# Patient Record
Sex: FEMALE | Race: WHITE | NOT HISPANIC OR LATINO | Employment: OTHER | URBAN - METROPOLITAN AREA
[De-identification: names, ages, dates, MRNs, and addresses within clinical notes are randomized per-mention and may not be internally consistent; named-entity substitution may affect disease eponyms.]

---

## 2023-04-08 DIAGNOSIS — I10 PRIMARY HYPERTENSION: Primary | ICD-10-CM

## 2023-04-10 RX ORDER — LISINOPRIL 40 MG/1
TABLET ORAL
Qty: 90 TABLET | Refills: 0 | Status: SHIPPED | OUTPATIENT
Start: 2023-04-10 | End: 2023-04-11

## 2023-04-11 DIAGNOSIS — I10 PRIMARY HYPERTENSION: ICD-10-CM

## 2023-04-11 RX ORDER — LISINOPRIL 40 MG/1
TABLET ORAL
Qty: 90 TABLET | Refills: 0 | Status: SHIPPED | OUTPATIENT
Start: 2023-04-11 | End: 2023-05-23 | Stop reason: SDUPTHER

## 2023-05-23 ENCOUNTER — LAB (OUTPATIENT)
Dept: LAB | Facility: LAB | Age: 78
End: 2023-05-23
Payer: MEDICARE

## 2023-05-23 ENCOUNTER — OFFICE VISIT (OUTPATIENT)
Dept: PRIMARY CARE | Facility: CLINIC | Age: 78
End: 2023-05-23
Payer: MEDICARE

## 2023-05-23 VITALS
RESPIRATION RATE: 18 BRPM | SYSTOLIC BLOOD PRESSURE: 114 MMHG | HEART RATE: 54 BPM | BODY MASS INDEX: 24.65 KG/M2 | WEIGHT: 153.4 LBS | OXYGEN SATURATION: 97 % | HEIGHT: 66 IN | DIASTOLIC BLOOD PRESSURE: 72 MMHG

## 2023-05-23 DIAGNOSIS — Z00.00 ROUTINE GENERAL MEDICAL EXAMINATION AT HEALTH CARE FACILITY: ICD-10-CM

## 2023-05-23 DIAGNOSIS — M47.816 LUMBAR SPONDYLOSIS: ICD-10-CM

## 2023-05-23 DIAGNOSIS — I10 PRIMARY HYPERTENSION: ICD-10-CM

## 2023-05-23 DIAGNOSIS — I10 PRIMARY HYPERTENSION: Primary | ICD-10-CM

## 2023-05-23 DIAGNOSIS — Z00.00 MEDICARE ANNUAL WELLNESS VISIT, SUBSEQUENT: ICD-10-CM

## 2023-05-23 DIAGNOSIS — Z12.31 SCREENING MAMMOGRAM, ENCOUNTER FOR: ICD-10-CM

## 2023-05-23 PROBLEM — N63.0 BREAST MASS: Status: RESOLVED | Noted: 2023-05-23 | Resolved: 2023-05-23

## 2023-05-23 PROBLEM — M48.061 LUMBAR STENOSIS: Status: RESOLVED | Noted: 2023-05-23 | Resolved: 2023-05-23

## 2023-05-23 PROBLEM — M53.3 SACROILIAC JOINT DYSFUNCTION OF RIGHT SIDE: Status: RESOLVED | Noted: 2023-05-23 | Resolved: 2023-05-23

## 2023-05-23 PROBLEM — M54.50 CHRONIC LOW BACK PAIN: Status: ACTIVE | Noted: 2023-05-23

## 2023-05-23 PROBLEM — M54.16 LUMBAR RADICULOPATHY: Status: RESOLVED | Noted: 2023-05-23 | Resolved: 2023-05-23

## 2023-05-23 PROBLEM — D73.4 SPLENIC CYST: Status: RESOLVED | Noted: 2023-05-23 | Resolved: 2023-05-23

## 2023-05-23 PROBLEM — R10.13 DYSPEPSIA: Status: RESOLVED | Noted: 2023-05-23 | Resolved: 2023-05-23

## 2023-05-23 PROBLEM — M17.9 OSTEOARTHRITIS OF KNEE: Status: RESOLVED | Noted: 2023-05-23 | Resolved: 2023-05-23

## 2023-05-23 PROBLEM — E78.00 HYPERCHOLESTEROLEMIA: Status: ACTIVE | Noted: 2023-05-23

## 2023-05-23 PROBLEM — M15.9 POLYOSTEOARTHRITIS, UNSPECIFIED: Status: RESOLVED | Noted: 2023-05-23 | Resolved: 2023-05-23

## 2023-05-23 PROBLEM — G89.29 CHRONIC LOW BACK PAIN: Status: ACTIVE | Noted: 2023-05-23

## 2023-05-23 LAB
ALANINE AMINOTRANSFERASE (SGPT) (U/L) IN SER/PLAS: 17 U/L (ref 7–45)
ALBUMIN (G/DL) IN SER/PLAS: 4.4 G/DL (ref 3.4–5)
ALKALINE PHOSPHATASE (U/L) IN SER/PLAS: 42 U/L (ref 33–136)
ANION GAP IN SER/PLAS: 13 MMOL/L (ref 10–20)
ASPARTATE AMINOTRANSFERASE (SGOT) (U/L) IN SER/PLAS: 20 U/L (ref 9–39)
BILIRUBIN TOTAL (MG/DL) IN SER/PLAS: 0.7 MG/DL (ref 0–1.2)
CALCIUM (MG/DL) IN SER/PLAS: 9.6 MG/DL (ref 8.6–10.3)
CARBON DIOXIDE, TOTAL (MMOL/L) IN SER/PLAS: 30 MMOL/L (ref 21–32)
CHLORIDE (MMOL/L) IN SER/PLAS: 102 MMOL/L (ref 98–107)
CHOLESTEROL (MG/DL) IN SER/PLAS: 167 MG/DL (ref 0–199)
CHOLESTEROL IN HDL (MG/DL) IN SER/PLAS: 49.6 MG/DL
CHOLESTEROL/HDL RATIO: 3.4
CREATININE (MG/DL) IN SER/PLAS: 1.27 MG/DL (ref 0.5–1.05)
ERYTHROCYTE DISTRIBUTION WIDTH (RATIO) BY AUTOMATED COUNT: 13.4 % (ref 11.5–14.5)
ERYTHROCYTE MEAN CORPUSCULAR HEMOGLOBIN CONCENTRATION (G/DL) BY AUTOMATED: 32 G/DL (ref 32–36)
ERYTHROCYTE MEAN CORPUSCULAR VOLUME (FL) BY AUTOMATED COUNT: 94 FL (ref 80–100)
ERYTHROCYTES (10*6/UL) IN BLOOD BY AUTOMATED COUNT: 4.23 X10E12/L (ref 4–5.2)
GFR FEMALE: 44 ML/MIN/1.73M2
GLUCOSE (MG/DL) IN SER/PLAS: 89 MG/DL (ref 74–99)
HEMATOCRIT (%) IN BLOOD BY AUTOMATED COUNT: 39.7 % (ref 36–46)
HEMOGLOBIN (G/DL) IN BLOOD: 12.7 G/DL (ref 12–16)
LDL: 77 MG/DL (ref 0–99)
LEUKOCYTES (10*3/UL) IN BLOOD BY AUTOMATED COUNT: 4.1 X10E9/L (ref 4.4–11.3)
NON HDL CHOLESTEROL: 117 MG/DL
PLATELETS (10*3/UL) IN BLOOD AUTOMATED COUNT: 197 X10E9/L (ref 150–450)
POTASSIUM (MMOL/L) IN SER/PLAS: 4.2 MMOL/L (ref 3.5–5.3)
PROTEIN TOTAL: 6.9 G/DL (ref 6.4–8.2)
SODIUM (MMOL/L) IN SER/PLAS: 141 MMOL/L (ref 136–145)
TRIGLYCERIDE (MG/DL) IN SER/PLAS: 200 MG/DL (ref 0–149)
UREA NITROGEN (MG/DL) IN SER/PLAS: 31 MG/DL (ref 6–23)
VLDL: 40 MG/DL (ref 0–40)

## 2023-05-23 PROCEDURE — 3078F DIAST BP <80 MM HG: CPT | Performed by: NURSE PRACTITIONER

## 2023-05-23 PROCEDURE — 3074F SYST BP LT 130 MM HG: CPT | Performed by: NURSE PRACTITIONER

## 2023-05-23 PROCEDURE — 36415 COLL VENOUS BLD VENIPUNCTURE: CPT

## 2023-05-23 PROCEDURE — 80061 LIPID PANEL: CPT

## 2023-05-23 PROCEDURE — 1160F RVW MEDS BY RX/DR IN RCRD: CPT | Performed by: NURSE PRACTITIONER

## 2023-05-23 PROCEDURE — 80053 COMPREHEN METABOLIC PANEL: CPT

## 2023-05-23 PROCEDURE — 1036F TOBACCO NON-USER: CPT | Performed by: NURSE PRACTITIONER

## 2023-05-23 PROCEDURE — 1159F MED LIST DOCD IN RCRD: CPT | Performed by: NURSE PRACTITIONER

## 2023-05-23 PROCEDURE — 1170F FXNL STATUS ASSESSED: CPT | Performed by: NURSE PRACTITIONER

## 2023-05-23 PROCEDURE — G0439 PPPS, SUBSEQ VISIT: HCPCS | Performed by: NURSE PRACTITIONER

## 2023-05-23 PROCEDURE — 99214 OFFICE O/P EST MOD 30 MIN: CPT | Performed by: NURSE PRACTITIONER

## 2023-05-23 PROCEDURE — 85027 COMPLETE CBC AUTOMATED: CPT

## 2023-05-23 RX ORDER — ATENOLOL 100 MG/1
100 TABLET ORAL
COMMUNITY
Start: 2020-08-02 | End: 2023-05-23 | Stop reason: SDUPTHER

## 2023-05-23 RX ORDER — SIMVASTATIN 40 MG/1
40 TABLET, FILM COATED ORAL DAILY
Qty: 90 TABLET | Refills: 3 | Status: SHIPPED | OUTPATIENT
Start: 2023-05-23 | End: 2024-05-20

## 2023-05-23 RX ORDER — HYDROCHLOROTHIAZIDE 25 MG/1
25 TABLET ORAL DAILY
Qty: 90 TABLET | Refills: 3 | Status: SHIPPED | OUTPATIENT
Start: 2023-05-23 | End: 2024-04-01 | Stop reason: SDUPTHER

## 2023-05-23 RX ORDER — PREGABALIN 50 MG/1
50 CAPSULE ORAL 2 TIMES DAILY
Qty: 60 CAPSULE | Refills: 2 | COMMUNITY
Start: 2023-05-11 | End: 2023-08-09

## 2023-05-23 RX ORDER — ATENOLOL 100 MG/1
100 TABLET ORAL
Qty: 90 TABLET | Refills: 3 | Status: SHIPPED | OUTPATIENT
Start: 2023-05-23 | End: 2024-05-20

## 2023-05-23 RX ORDER — HYDROCHLOROTHIAZIDE 25 MG/1
1 TABLET ORAL DAILY
COMMUNITY
Start: 2013-03-06 | End: 2023-05-23 | Stop reason: SDUPTHER

## 2023-05-23 RX ORDER — LISINOPRIL 40 MG/1
40 TABLET ORAL DAILY
Qty: 90 TABLET | Refills: 3 | Status: SHIPPED | OUTPATIENT
Start: 2023-05-23 | End: 2024-04-01 | Stop reason: SDUPTHER

## 2023-05-23 RX ORDER — SIMVASTATIN 40 MG/1
40 TABLET, FILM COATED ORAL DAILY
COMMUNITY
End: 2023-05-23 | Stop reason: SDUPTHER

## 2023-05-23 RX ORDER — PREGABALIN 100 MG/1
100 CAPSULE ORAL 2 TIMES DAILY
Qty: 60 CAPSULE | Refills: 5 | COMMUNITY
Start: 2023-05-11 | End: 2023-11-07

## 2023-05-23 RX ORDER — CALCIUM CARBONATE 600 MG
1 TABLET ORAL DAILY
COMMUNITY

## 2023-05-23 RX ORDER — VIT C/E/ZN/COPPR/LUTEIN/ZEAXAN 250MG-90MG
CAPSULE ORAL
COMMUNITY

## 2023-05-23 ASSESSMENT — PATIENT HEALTH QUESTIONNAIRE - PHQ9
SUM OF ALL RESPONSES TO PHQ9 QUESTIONS 1 AND 2: 0
2. FEELING DOWN, DEPRESSED OR HOPELESS: NOT AT ALL
1. LITTLE INTEREST OR PLEASURE IN DOING THINGS: NOT AT ALL

## 2023-05-23 ASSESSMENT — ENCOUNTER SYMPTOMS
OCCASIONAL FEELINGS OF UNSTEADINESS: 0
DEPRESSION: 0
LOSS OF SENSATION IN FEET: 0

## 2023-05-23 ASSESSMENT — ACTIVITIES OF DAILY LIVING (ADL)
TAKING_MEDICATION: INDEPENDENT
DRESSING: INDEPENDENT
MANAGING_FINANCES: INDEPENDENT
BATHING: INDEPENDENT
GROCERY_SHOPPING: INDEPENDENT
DOING_HOUSEWORK: INDEPENDENT

## 2023-05-23 NOTE — ASSESSMENT & PLAN NOTE
- UTD with vaccine   -  colonoscopy due 2025  - mammogram ordered  - dexa 2020 normal - due 2025  -  living will reviewed with patient

## 2023-05-23 NOTE — PATIENT INSTRUCTIONS
I ordered your mammogram due 9/6/23    I ordered your fasting labs    Sent in your refills as well

## 2023-05-23 NOTE — ASSESSMENT & PLAN NOTE
- in office /72  -  continue atenolol  - continue hydrochlorothiazide   - cbc,cmp, lipid   -  follow up in 6 months

## 2023-05-23 NOTE — PROGRESS NOTES
"Subjective   Reason for Visit: Ana Bolton is an 77 y.o. female here for a Medicare Wellness visit.     Past Medical, Surgical, and Family History reviewed and updated in chart.    Reviewed all medications by prescribing practitioner or clinical pharmacist (such as prescriptions, OTCs, herbal therapies and supplements) and documented in the medical record.    Presents for AMW    Mammogram 9/2022  Colonoscopy due 2025  Bone density - 2020 due 2025  PPSV 2011    She does not smoke       Denies complaints     She has lost 10 pounds since her last visit.  She has been walking most days of the week to help     Follows Dr. Garvey - [facility]        Patient Care Team:  URSULA Prather as PCP - General  URSULA Prather as PCP - [employer]age PCP     Review of Systems   All other systems reviewed and are negative.      Objective   Vitals:  /72   Pulse 54   Resp 18   Ht 1.676 m (5' 6\")   Wt 69.6 kg (153 lb 6.4 oz)   SpO2 97%   BMI 24.76 kg/m²       Physical Exam  Vitals and nursing note reviewed.   Constitutional:       Appearance: Normal appearance. She is normal weight.   HENT:      Head: Normocephalic and atraumatic.      Right Ear: Tympanic membrane, ear canal and external ear normal.      Left Ear: Tympanic membrane, ear canal and external ear normal.      Nose: Nose normal.      Mouth/Throat:      Mouth: Mucous membranes are moist.      Pharynx: Oropharynx is clear.   Eyes:      Extraocular Movements: Extraocular movements intact.      Conjunctiva/sclera: Conjunctivae normal.      Pupils: Pupils are equal, round, and reactive to light.   Neck:      Vascular: No carotid bruit.   Cardiovascular:      Rate and Rhythm: Normal rate and regular rhythm.      Pulses: Normal pulses.      Heart sounds: Normal heart sounds.   Pulmonary:      Effort: Pulmonary effort is normal.      Breath sounds: Normal breath sounds.   Abdominal:      General: Bowel sounds are normal. " There is no distension.      Palpations: Abdomen is soft.   Musculoskeletal:         General: Normal range of motion.      Cervical back: Normal range of motion and neck supple.      Right lower leg: No edema.      Left lower leg: No edema.   Lymphadenopathy:      Cervical: No cervical adenopathy.   Skin:     General: Skin is warm and dry.      Capillary Refill: Capillary refill takes less than 2 seconds.   Neurological:      General: No focal deficit present.      Mental Status: She is alert and oriented to person, place, and time. Mental status is at baseline.   Psychiatric:         Mood and Affect: Mood normal.         Behavior: Behavior normal.         Thought Content: Thought content normal.         Judgment: Judgment normal.         Assessment/Plan   Problem List Items Addressed This Visit          Circulatory    Hypertension - Primary    Overview     - in office /72  -  continue atenolol  - continue hydrochlorothiazide   - cbc,cmp, lipid   -  follow up in 6 months           Current Assessment & Plan     - in office /72  -  continue atenolol  - continue hydrochlorothiazide   - cbc,cmp, lipid   -  follow up in 6 months         Relevant Medications    simvastatin (Zocor) 40 mg tablet    lisinopril 40 mg tablet    hydroCHLOROthiazide (HYDRODiuril) 25 mg tablet    atenolol (Tenormin) 100 mg tablet    Other Relevant Orders    CBC    Lipid Panel    Comprehensive Metabolic Panel       Musculoskeletal    Lumbar spondylosis    Overview     Stable   Follows Dr. Garvey  Taking lyrica         Current Assessment & Plan     Stable   Follows Dr. Garvey  Taking lyrica            Other    Medicare annual wellness visit, subsequent    Overview     - UTD with vaccine   -  colonoscopy due 2025  - mammogram ordered  - dexa 2020 normal - due 2025  -  living will reviewed with patient           Current Assessment & Plan     - UTD with vaccine   -  colonoscopy due 2025  - mammogram ordered  - dexa 2020 normal - due 2025  -   living will reviewed with patient          Other Visit Diagnoses       Routine general medical examination at health care facility        Screening mammogram, encounter for        Relevant Orders    BI mammo bilateral screening tomosynthesis

## 2023-06-06 ENCOUNTER — OFFICE VISIT (OUTPATIENT)
Dept: PRIMARY CARE | Facility: CLINIC | Age: 78
End: 2023-06-06
Payer: MEDICARE

## 2023-06-06 VITALS
SYSTOLIC BLOOD PRESSURE: 134 MMHG | BODY MASS INDEX: 25.07 KG/M2 | DIASTOLIC BLOOD PRESSURE: 78 MMHG | HEIGHT: 66 IN | OXYGEN SATURATION: 98 % | HEART RATE: 60 BPM | WEIGHT: 156 LBS

## 2023-06-06 DIAGNOSIS — L24.7 IRRITANT CONTACT DERMATITIS DUE TO PLANTS, EXCEPT FOOD: Primary | ICD-10-CM

## 2023-06-06 PROCEDURE — 99213 OFFICE O/P EST LOW 20 MIN: CPT | Performed by: STUDENT IN AN ORGANIZED HEALTH CARE EDUCATION/TRAINING PROGRAM

## 2023-06-06 PROCEDURE — 1159F MED LIST DOCD IN RCRD: CPT | Performed by: STUDENT IN AN ORGANIZED HEALTH CARE EDUCATION/TRAINING PROGRAM

## 2023-06-06 PROCEDURE — 3075F SYST BP GE 130 - 139MM HG: CPT | Performed by: STUDENT IN AN ORGANIZED HEALTH CARE EDUCATION/TRAINING PROGRAM

## 2023-06-06 PROCEDURE — 1160F RVW MEDS BY RX/DR IN RCRD: CPT | Performed by: STUDENT IN AN ORGANIZED HEALTH CARE EDUCATION/TRAINING PROGRAM

## 2023-06-06 PROCEDURE — 3078F DIAST BP <80 MM HG: CPT | Performed by: STUDENT IN AN ORGANIZED HEALTH CARE EDUCATION/TRAINING PROGRAM

## 2023-06-06 PROCEDURE — 1036F TOBACCO NON-USER: CPT | Performed by: STUDENT IN AN ORGANIZED HEALTH CARE EDUCATION/TRAINING PROGRAM

## 2023-06-06 RX ORDER — METHYLPREDNISOLONE 4 MG/1
TABLET ORAL
Qty: 21 TABLET | Refills: 0 | Status: SHIPPED | OUTPATIENT
Start: 2023-06-06 | End: 2023-06-13

## 2023-06-06 ASSESSMENT — PATIENT HEALTH QUESTIONNAIRE - PHQ9
SUM OF ALL RESPONSES TO PHQ9 QUESTIONS 1 AND 2: 0
1. LITTLE INTEREST OR PLEASURE IN DOING THINGS: NOT AT ALL
2. FEELING DOWN, DEPRESSED OR HOPELESS: NOT AT ALL

## 2023-06-06 NOTE — PROGRESS NOTES
Subjective   Patient ID: Ana Bolton is a 77 y.o. female who presents for Poison Chanda (Bon is here for poison ivy X 10  days on both arms and stomach ).  Poison Ivy  This is a new problem. The current episode started 1 to 4 weeks ago. The problem has been gradually worsening since onset. The affected locations include the abdomen, left wrist, left hand and right shoulder. Past treatments include topical steroids.       Review of Systems    Objective   Physical Exam  Constitutional:       Appearance: Normal appearance.   Cardiovascular:      Rate and Rhythm: Normal rate and regular rhythm.      Heart sounds: No murmur heard.  Pulmonary:      Effort: Pulmonary effort is normal. No respiratory distress.      Breath sounds: Normal breath sounds. No wheezing.   Musculoskeletal:      Cervical back: Neck supple.      Left lower leg: No edema.   Skin:            Comments: Erythemtatous papules    Neurological:      Mental Status: She is alert.       Assessment/Plan   Diagnoses and all orders for this visit:  Irritant contact dermatitis due to plants, except food  -     methylPREDNISolone (Medrol Dospak) 4 mg tablets; Take as directed on package.

## 2023-11-28 ENCOUNTER — APPOINTMENT (OUTPATIENT)
Dept: PRIMARY CARE | Facility: CLINIC | Age: 78
End: 2023-11-28
Payer: MEDICARE

## 2024-01-22 ENCOUNTER — APPOINTMENT (OUTPATIENT)
Dept: PRIMARY CARE | Facility: CLINIC | Age: 79
End: 2024-01-22
Payer: MEDICARE

## 2024-04-01 ENCOUNTER — OFFICE VISIT (OUTPATIENT)
Dept: PRIMARY CARE | Facility: CLINIC | Age: 79
End: 2024-04-01
Payer: MEDICARE

## 2024-04-01 VITALS
SYSTOLIC BLOOD PRESSURE: 116 MMHG | RESPIRATION RATE: 18 BRPM | DIASTOLIC BLOOD PRESSURE: 74 MMHG | HEART RATE: 74 BPM | OXYGEN SATURATION: 96 % | WEIGHT: 153.4 LBS | HEIGHT: 66 IN | BODY MASS INDEX: 24.65 KG/M2

## 2024-04-01 DIAGNOSIS — E78.00 HYPERCHOLESTEROLEMIA: ICD-10-CM

## 2024-04-01 DIAGNOSIS — Z00.00 ROUTINE GENERAL MEDICAL EXAMINATION AT HEALTH CARE FACILITY: Primary | ICD-10-CM

## 2024-04-01 DIAGNOSIS — M54.41 CHRONIC MIDLINE LOW BACK PAIN WITH RIGHT-SIDED SCIATICA: ICD-10-CM

## 2024-04-01 DIAGNOSIS — G89.29 CHRONIC MIDLINE LOW BACK PAIN WITH RIGHT-SIDED SCIATICA: ICD-10-CM

## 2024-04-01 DIAGNOSIS — I10 PRIMARY HYPERTENSION: ICD-10-CM

## 2024-04-01 DIAGNOSIS — Z00.00 MEDICARE ANNUAL WELLNESS VISIT, SUBSEQUENT: ICD-10-CM

## 2024-04-01 PROBLEM — L57.0 ACTINIC KERATOSIS: Status: ACTIVE | Noted: 2020-10-07

## 2024-04-01 PROBLEM — D18.01 HEMANGIOMA OF SKIN AND SUBCUTANEOUS TISSUE: Status: ACTIVE | Noted: 2020-10-07

## 2024-04-01 PROCEDURE — 1159F MED LIST DOCD IN RCRD: CPT | Performed by: NURSE PRACTITIONER

## 2024-04-01 PROCEDURE — 3074F SYST BP LT 130 MM HG: CPT | Performed by: NURSE PRACTITIONER

## 2024-04-01 PROCEDURE — 1036F TOBACCO NON-USER: CPT | Performed by: NURSE PRACTITIONER

## 2024-04-01 PROCEDURE — 3078F DIAST BP <80 MM HG: CPT | Performed by: NURSE PRACTITIONER

## 2024-04-01 PROCEDURE — 1170F FXNL STATUS ASSESSED: CPT | Performed by: NURSE PRACTITIONER

## 2024-04-01 PROCEDURE — 1160F RVW MEDS BY RX/DR IN RCRD: CPT | Performed by: NURSE PRACTITIONER

## 2024-04-01 PROCEDURE — 99214 OFFICE O/P EST MOD 30 MIN: CPT | Performed by: NURSE PRACTITIONER

## 2024-04-01 PROCEDURE — G0439 PPPS, SUBSEQ VISIT: HCPCS | Performed by: NURSE PRACTITIONER

## 2024-04-01 RX ORDER — LISINOPRIL 40 MG/1
40 TABLET ORAL DAILY
Qty: 90 TABLET | Refills: 3 | Status: SHIPPED | OUTPATIENT
Start: 2024-04-01 | End: 2025-04-01

## 2024-04-01 RX ORDER — HYDROCHLOROTHIAZIDE 25 MG/1
25 TABLET ORAL DAILY
Qty: 90 TABLET | Refills: 3 | Status: SHIPPED | OUTPATIENT
Start: 2024-04-01 | End: 2025-04-01

## 2024-04-01 ASSESSMENT — ENCOUNTER SYMPTOMS
DEPRESSION: 0
LOSS OF SENSATION IN FEET: 0
OCCASIONAL FEELINGS OF UNSTEADINESS: 0

## 2024-04-01 ASSESSMENT — PATIENT HEALTH QUESTIONNAIRE - PHQ9
1. LITTLE INTEREST OR PLEASURE IN DOING THINGS: NOT AT ALL
SUM OF ALL RESPONSES TO PHQ9 QUESTIONS 1 AND 2: 0
2. FEELING DOWN, DEPRESSED OR HOPELESS: NOT AT ALL

## 2024-04-01 ASSESSMENT — ACTIVITIES OF DAILY LIVING (ADL)
MANAGING_FINANCES: INDEPENDENT
DOING_HOUSEWORK: INDEPENDENT
TAKING_MEDICATION: INDEPENDENT
DRESSING: INDEPENDENT
BATHING: INDEPENDENT
GROCERY_SHOPPING: INDEPENDENT

## 2024-04-01 NOTE — ASSESSMENT & PLAN NOTE
- UTD with vaccine   -  colonoscopy due 2025  - mammogram 9/2023  - dexa 2020 normal - due 2025  -  living will reviewed with patient

## 2024-04-01 NOTE — ASSESSMENT & PLAN NOTE
- in office BP stable  -  continue atenolol  -continue lisinopril   - continue hydrochlorothiazide   - cbc,cmp, lipid   -  follow up in 6 months

## 2024-04-01 NOTE — PROGRESS NOTES
"Subjective   Reason for Visit: Ana Bolton is an 78 y.o. female here for a Medicare Wellness visit.     Past Medical, Surgical, and Family History reviewed and updated in chart.    Reviewed all medications by prescribing practitioner or clinical pharmacist (such as prescriptions, OTCs, herbal therapies and supplements) and documented in the medical record.    Presents for AMW and HTN follow up    Hypertension  Patient is here for follow-up of elevated blood pressure. She is not exercising and is adherent to a low-salt diet. Blood pressure is well controlled at home. Cardiac symptoms: none. Patient denies chest pain, dyspnea, exertional chest pressure/discomfort, fatigue, lower extremity edema, near-syncope, orthopnea, palpitations, and paroxysmal nocturnal dyspnea. Cardiovascular risk factors: advanced age (older than 55 for men, 65 for women) and hypertension. Use of agents associated with hypertension: none. History of target organ damage: none.    Following Dr. Garvey for pain management.  She is taking lyrica 150 mg twice a day - has greatly helped her pain  Colonoscopy no longer indicated   Mammogram 9/14/23  [facility] 2011        Patient Care Team:  FLY Infante-JONNY as PCP - General  Bess Spencer DO as PCP - [employer]age PCP     Review of Systems   All other systems reviewed and are negative.      Objective   Vitals:  /74   Pulse 74   Resp 18   Ht 1.676 m (5' 6\")   Wt 69.6 kg (153 lb 6.4 oz)   SpO2 96%   BMI 24.76 kg/m²       Physical Exam  Vitals and nursing note reviewed.   Constitutional:       General: She is not in acute distress.     Appearance: Normal appearance. She is normal weight.   HENT:      Head: Normocephalic and atraumatic.      Right Ear: Tympanic membrane, ear canal and external ear normal.      Left Ear: Tympanic membrane, ear canal and external ear normal.      Nose: Nose normal.      Mouth/Throat:      Mouth: Mucous membranes are moist.      " Pharynx: Oropharynx is clear.   Eyes:      Extraocular Movements: Extraocular movements intact.      Conjunctiva/sclera: Conjunctivae normal.      Pupils: Pupils are equal, round, and reactive to light.   Neck:      Vascular: No carotid bruit.   Cardiovascular:      Rate and Rhythm: Normal rate and regular rhythm.      Pulses: Normal pulses.      Heart sounds: Normal heart sounds.   Pulmonary:      Effort: Pulmonary effort is normal.      Breath sounds: Normal breath sounds.   Abdominal:      General: Bowel sounds are normal. There is no distension.      Palpations: Abdomen is soft.      Tenderness: There is no abdominal tenderness.   Musculoskeletal:         General: Normal range of motion.      Cervical back: Normal range of motion and neck supple.      Right lower leg: No edema.      Left lower leg: No edema.   Lymphadenopathy:      Cervical: No cervical adenopathy.   Skin:     General: Skin is warm and dry.      Capillary Refill: Capillary refill takes less than 2 seconds.   Neurological:      General: No focal deficit present.      Mental Status: She is alert and oriented to person, place, and time. Mental status is at baseline.      Motor: No weakness.   Psychiatric:         Mood and Affect: Mood normal.         Behavior: Behavior normal.         Thought Content: Thought content normal.         Judgment: Judgment normal.         Assessment/Plan   Problem List Items Addressed This Visit       Chronic low back pain    Overview     Stable   Follows Dr. Garvey  Taking lyrica 150 mg twice a day         Current Assessment & Plan     Stable   Follows Dr. Garvey  Taking lyrica 150 mg twice a day         Hypercholesterolemia    Overview     Lipid panel ordered  Continue simvastatin         Current Assessment & Plan     Lipid panel ordered  Continue simvastatin         Hypertension    Overview     - in office BP stable  -  continue atenolol  -continue lisinopril   - continue hydrochlorothiazide   - cbc,cmp, lipid   -   follow up in 6 months           Current Assessment & Plan     - in office BP stable  -  continue atenolol  -continue lisinopril   - continue hydrochlorothiazide   - cbc,cmp, lipid   -  follow up in 6 months         Medicare annual wellness visit, subsequent    Overview     - UTD with vaccine   -  colonoscopy due 2025  - mammogram 9/2023  - dexa 2020 normal - due 2025  -  living will reviewed with patient           Current Assessment & Plan     - UTD with vaccine   -  colonoscopy due 2025  - mammogram 9/2023  - dexa 2020 normal - due 2025  -  living will reviewed with patient          Other Visit Diagnoses       Routine general medical examination at health care facility    -  Primary

## 2024-04-25 ENCOUNTER — LAB (OUTPATIENT)
Dept: LAB | Facility: LAB | Age: 79
End: 2024-04-25
Payer: MEDICARE

## 2024-04-25 DIAGNOSIS — I10 PRIMARY HYPERTENSION: ICD-10-CM

## 2024-04-25 LAB
ERYTHROCYTE [DISTWIDTH] IN BLOOD BY AUTOMATED COUNT: 14 % (ref 11.5–14.5)
HCT VFR BLD AUTO: 41 % (ref 36–46)
HGB BLD-MCNC: 12.8 G/DL (ref 12–16)
MCH RBC QN AUTO: 30.3 PG (ref 26–34)
MCHC RBC AUTO-ENTMCNC: 31.2 G/DL (ref 32–36)
MCV RBC AUTO: 97 FL (ref 80–100)
NRBC BLD-RTO: 0 /100 WBCS (ref 0–0)
PLATELET # BLD AUTO: 264 X10*3/UL (ref 150–450)
RBC # BLD AUTO: 4.22 X10*6/UL (ref 4–5.2)
WBC # BLD AUTO: 5.8 X10*3/UL (ref 4.4–11.3)

## 2024-04-25 PROCEDURE — 80061 LIPID PANEL: CPT

## 2024-04-25 PROCEDURE — 80053 COMPREHEN METABOLIC PANEL: CPT

## 2024-04-25 PROCEDURE — 85027 COMPLETE CBC AUTOMATED: CPT

## 2024-04-25 PROCEDURE — 36415 COLL VENOUS BLD VENIPUNCTURE: CPT

## 2024-04-26 LAB
ALBUMIN SERPL BCP-MCNC: 4.4 G/DL (ref 3.4–5)
ALP SERPL-CCNC: 44 U/L (ref 33–136)
ALT SERPL W P-5'-P-CCNC: 12 U/L (ref 7–45)
ANION GAP SERPL CALC-SCNC: 14 MMOL/L (ref 10–20)
AST SERPL W P-5'-P-CCNC: 18 U/L (ref 9–39)
BILIRUB SERPL-MCNC: 0.6 MG/DL (ref 0–1.2)
BUN SERPL-MCNC: 29 MG/DL (ref 6–23)
CALCIUM SERPL-MCNC: 10 MG/DL (ref 8.6–10.3)
CHLORIDE SERPL-SCNC: 102 MMOL/L (ref 98–107)
CHOLEST SERPL-MCNC: 198 MG/DL (ref 0–199)
CHOLESTEROL/HDL RATIO: 3.6
CO2 SERPL-SCNC: 29 MMOL/L (ref 21–32)
CREAT SERPL-MCNC: 1.45 MG/DL (ref 0.5–1.05)
EGFRCR SERPLBLD CKD-EPI 2021: 37 ML/MIN/1.73M*2
GLUCOSE SERPL-MCNC: 78 MG/DL (ref 74–99)
HDLC SERPL-MCNC: 55.2 MG/DL
LDLC SERPL CALC-MCNC: 117 MG/DL
NON HDL CHOLESTEROL: 143 MG/DL (ref 0–149)
POTASSIUM SERPL-SCNC: 4.4 MMOL/L (ref 3.5–5.3)
PROT SERPL-MCNC: 7.2 G/DL (ref 6.4–8.2)
SODIUM SERPL-SCNC: 141 MMOL/L (ref 136–145)
TRIGL SERPL-MCNC: 131 MG/DL (ref 0–149)
VLDL: 26 MG/DL (ref 0–40)

## 2024-05-16 DIAGNOSIS — I10 PRIMARY HYPERTENSION: ICD-10-CM

## 2024-05-20 RX ORDER — SIMVASTATIN 40 MG/1
40 TABLET, FILM COATED ORAL DAILY
Qty: 90 TABLET | Refills: 0 | Status: SHIPPED | OUTPATIENT
Start: 2024-05-20

## 2024-05-20 RX ORDER — ATENOLOL 100 MG/1
100 TABLET ORAL DAILY
Qty: 90 TABLET | Refills: 0 | Status: SHIPPED | OUTPATIENT
Start: 2024-05-20

## 2024-06-20 ENCOUNTER — EVALUATION (OUTPATIENT)
Dept: PHYSICAL THERAPY | Facility: CLINIC | Age: 79
End: 2024-06-20
Payer: MEDICARE

## 2024-06-20 DIAGNOSIS — M47.816 SPONDYLOSIS WITHOUT MYELOPATHY OR RADICULOPATHY, LUMBAR REGION: ICD-10-CM

## 2024-06-20 PROCEDURE — 97161 PT EVAL LOW COMPLEX 20 MIN: CPT | Mod: GP | Performed by: PHYSICAL THERAPIST

## 2024-06-20 ASSESSMENT — ENCOUNTER SYMPTOMS
LOSS OF SENSATION IN FEET: 0
DEPRESSION: 0
OCCASIONAL FEELINGS OF UNSTEADINESS: 0

## 2024-06-20 NOTE — Clinical Note
June 20, 2024    Felicitas Garvey DO  [address] [place] [address]    Patient: Ana Bolton   YOB: 1945   Date of Visit: 6/20/2024       Dear Felicitas Garvey DO  [address] [place] [address]    The attached plan of care is being sent to you because your patient’s medical reimbursement requires that you certify the plan of care. Your signature is required to allow uninterrupted insurance coverage.      You may indicate your approval by signing below and faxing this form back to us at Dept Fax: [phone].    Please call Dept: [phone] with any questions or concerns.    Thank you for this referral,        Ivis Chaudhry PT  [facility]  [facility]  [address]  [facility] [address]    Payer: Payor: [employer] / Plan: [facility] MEDICARE ADVANTAGE / Product Type: *No Product type* /                                                                         Date:     Dear Ivis Chaudhry PT,     Re: Ms. Ana Bolton, MRN:[id number]    I certify that I have reviewed the attached plan of care and it is medically necessary for Ms. Ana Bolton (1945) who is under my care.          ______________________________________                    _________________  Provider name and credentials                                           Date and time                                                                                           Plan of Care 6/20/24   Effective from: 6/20/2024  Effective to: 9/18/2024    Plan ID: [id number]            Participants as of Finalize on 6/20/2024    Name Type Comments Contact Info    Felicitas Garvey DO Referring Provider  [phone]    Ivis Chaudhry PT Physical Therapist  [phone]       Last Plan Note     Author: Ivis Chaudhry PT Status: Sign when Signing Visit Last edited: 6/20/2024 11:30 AM       Physical Therapy Evaluation and Treatment      Patient Name: Ana Bolton  MRN:  [id number]  Today's Date: 6/20/2024  Time Calculation  Start Time: 1130  Stop Time: 1210  Time Calculation (min): 40 min      Insurance:  Visit number: 1 of 6  Authorization info: EVAL only  Insurance Type: Payor: [employer]IC[employer] / Plan: [employer] MEDICARE ADVANTAGE / Product Type: *No Product type* /     Current Problem:   1. Spondylosis without myelopathy or radiculopathy, lumbar region  Referral to Physical Therapy    Follow Up In Physical Therapy          Subjective    General:  Pt reports she has had low back pain and terrible right sided sciatica for the past few years since COVID. She feels all the pain is muscle related currently and that right sided sciatica is 95% resolved. Still getting some low back pain. MRI showed compressed L5-S1 and was put on gabapentin for a year by Dr Garvey at [facility]. Now taking Lyrica, 2x day, which has been helping. Finds at the beginning of the year she noticed she was leaning to the side and not standing up straight. She would like to work on posture. Epidural injection with no relief. Pain with walking and really enjoys walking, but this has been painful. Pain increases by the end of the day where she has to sit down. Cannot move furniture, etc and has hired . Also, would like to return to riding her bike more.     (See order for specific DO recommendations)      Precautions: None  Pain: 6/10      Objective   ROM   Lumbar spine AROM:  Flex 50%  Ext 10%  Lat flex L/R 25%  Rot L/R 50%  (Pain with ext, lat flex)    MMT   Bilat LE strength:  Hip flex 4/5  Knee flex 4+/5  Knee ext 4+/5      Palpation   TTP lumbar vertebrae and paraspinals  (erector spinae)  TTP bilat PSIS     Posture  Left lateral trunk flexion, thoracic kyphosis   Pronounced right scapula protraction    Flexibility   Moderate tightness in bilat hip flexors and hamstrings R>L      Special Tests   SLUMP: negative, bilaterally     Transfers   Bilat UE support for sit to stand    Gait   Ambulates with decreased  eligio and left lateral trunk lean with mild forward flexed. Moderate to mild trendelenberg pattern    Stairs   Ascends and descends stairs with reciprocal pattern using 1 rail     Outcome Measures:  Low Back Disability / Oswestry: 17/50    Treatments:  Therapeutic Exercise: Access Code [email or web address]  Scapular retraction  Bridge  SLR  S/L hip ABD      Assessment   Assessment:   Pt is a 78 y.o. female with chronic low back pain with spondylosis and resolved sciatica. Pt with decreased ROM, reduced LE/core strength, gait deficits, significantly abnormal posture, and flexibility limitations. Pt will benefit from skilled PT to address the above deficits for improvement in functional activities.       Low complexity due to patient's clinical presentation being stable and uncomplicated by any significant comorbidities that may affect rehab tolerance and progression.     Plan:   Treatment/Interventions: Education/ Instruction, Gait training, Manual therapy, Neuromuscular re-education, Self care/ home management, Therapeutic activities, Therapeutic exercises  PT Plan: Skilled PT  PT Frequency: 1 time per week  Duration: 5 more visits  Number of Treatments Authorized: EVAL only  Rehab Potential: Good  Plan of Care Agreement: Patient      Goals:   Active       Mobility       Goal 1       Start:  06/20/24    Expected End:  09/18/24       Pt will improve lumbar spine AROM to WNL to improve I/ADLs         Goal 2       Start:  06/20/24    Expected End:  09/18/24       Pt will improve LE and core strength to 5/5 to improve I/ADLs            Pain       Goal 1       Start:  06/20/24    Expected End:  09/18/24       Pt will demonstrate normal posture in all positions and during ambulation for >30 min with <3/10 pain to improve I/ADLs.          Goal 2       Start:  06/20/24    Expected End:  09/18/24       Pt will return to all hobbies/recreational activities with <3/10 pain                              Current Participants as of  6/20/2024    Name Type Comments Contact Info    Felicitas Garvey DO Referring Provider  [phone]    Signature pending    Ivis Chaudhry, PT Physical Therapist  [phone]

## 2024-06-20 NOTE — PROGRESS NOTES
Physical Therapy Evaluation and Treatment      Patient Name: Ana Bolton  MRN: [id number]  Today's Date: 6/20/2024  Time Calculation  Start Time: 1130  Stop Time: 1210  Time Calculation (min): 40 min      Insurance:  Visit number: 1 of 6  Authorization info: EVAL only  Insurance Type: Payor: [employer]IC[employer] / Plan: [employer] MEDICARE ADVANTAGE / Product Type: *No Product type* /     Current Problem:   1. Spondylosis without myelopathy or radiculopathy, lumbar region  Referral to Physical Therapy    Follow Up In Physical Therapy          Subjective    General:  Pt reports she has had low back pain and terrible right sided sciatica for the past few years since COVID. She feels all the pain is muscle related currently and that right sided sciatica is 95% resolved. Still getting some low back pain. MRI showed compressed L5-S1 and was put on gabapentin for a year by Dr Garvey at [facility]. Now taking Lyrica, 2x day, which has been helping. Finds at the beginning of the year she noticed she was leaning to the side and not standing up straight. She would like to work on posture. Epidural injection with no relief. Pain with walking and really enjoys walking, but this has been painful. Pain increases by the end of the day where she has to sit down. Cannot move furniture, etc and has hired . Also, would like to return to riding her bike more.     (See order for specific DO recommendations)      Precautions: None  Pain: 6/10      Objective   ROM   Lumbar spine AROM:  Flex 50%  Ext 10%  Lat flex L/R 25%  Rot L/R 50%  (Pain with ext, lat flex)    MMT   Bilat LE strength:  Hip flex 4/5  Knee flex 4+/5  Knee ext 4+/5      Palpation   TTP lumbar vertebrae and paraspinals  (erector spinae)  TTP bilat PSIS     Posture  Left lateral trunk flexion, thoracic kyphosis   Pronounced right scapula protraction    Flexibility   Moderate tightness in bilat hip flexors and hamstrings R>L      Special Tests   SLUMP: negative, bilaterally      Transfers   Bilat UE support for sit to stand    Gait   Ambulates with decreased eligio and left lateral trunk lean with mild forward flexed. Moderate to mild trendelenberg pattern    Stairs   Ascends and descends stairs with reciprocal pattern using 1 rail     Outcome Measures:  Low Back Disability / Oswestry: 17/50    Treatments:  Therapeutic Exercise: Access Code [email or web address]  Scapular retraction  Bridge  SLR  S/L hip ABD      Assessment   Assessment:   Pt is a 78 y.o. female with chronic low back pain with spondylosis and resolved sciatica. Pt with decreased ROM, reduced LE/core strength, gait deficits, significantly abnormal posture, and flexibility limitations. Pt will benefit from skilled PT to address the above deficits for improvement in functional activities.       Low complexity due to patient's clinical presentation being stable and uncomplicated by any significant comorbidities that may affect rehab tolerance and progression.     Plan:   Treatment/Interventions: Education/ Instruction, Gait training, Manual therapy, Neuromuscular re-education, Self care/ home management, Therapeutic activities, Therapeutic exercises  PT Plan: Skilled PT  PT Frequency: 1 time per week  Duration: 5 more visits  Number of Treatments Authorized: EVAL only  Rehab Potential: Good  Plan of Care Agreement: Patient      Goals:   Active       Mobility       Goal 1       Start:  06/20/24    Expected End:  09/18/24       Pt will improve lumbar spine AROM to WNL to improve I/ADLs         Goal 2       Start:  06/20/24    Expected End:  09/18/24       Pt will improve LE and core strength to 5/5 to improve I/ADLs            Pain       Goal 1       Start:  06/20/24    Expected End:  09/18/24       Pt will demonstrate normal posture in all positions and during ambulation for >30 min with <3/10 pain to improve I/ADLs.          Goal 2       Start:  06/20/24    Expected End:  09/18/24       Pt will return to all hobbies/recreational  activities with <3/10 pain

## 2024-07-11 ENCOUNTER — TREATMENT (OUTPATIENT)
Dept: PHYSICAL THERAPY | Facility: CLINIC | Age: 79
End: 2024-07-11
Payer: MEDICARE

## 2024-07-11 DIAGNOSIS — M47.816 SPONDYLOSIS WITHOUT MYELOPATHY OR RADICULOPATHY, LUMBAR REGION: ICD-10-CM

## 2024-07-11 PROCEDURE — 97110 THERAPEUTIC EXERCISES: CPT | Mod: GP | Performed by: PHYSICAL THERAPIST

## 2024-07-11 NOTE — PROGRESS NOTES
Physical Therapy Treatment    Patient Name: Ana Bolton  MRN: [id number]  Today's Date: 7/11/2024  Time Calculation  Start Time: 1109  Stop Time: 1150  Time Calculation (min): 41 min  PT Therapeutic Procedures Time Entry  Therapeutic Exercise Time Entry: 41    Insurance:  Visit number: 2 of 6  Authorization info: 6 visits until 9/8/2024  Insurance Type: Payor: [employer] MEDICARE / Plan: [employer] MEDICARE ADVANTAGE / Product Type: *No Product type* /     Current Problem   1. Spondylosis without myelopathy or radiculopathy, lumbar region  Follow Up In Physical Therapy          Subjective   General   Patient has been doing exercises and thinks her posture has improved since beginning them. Has found she gets tired by end of day and her posture gets worse with some discomfort in low back.     Precautions: None  Pain 0/10  Post Treatment Pain Level 0/10 (she thinks by end of day it will get to 6/10)    Objective   FAIR Abdominal strength    Treatments:  Therapeutic Exercise:  NuStep L5 x6 minutes   Gastroc stretch at wedge, 30 seconds x 3  Stand hamstring stretch, 30 seconds x 2 ea R/L  Stand hip ABD, 2x10 ea R/L  Scapular retraction, 2x10  Good Morning, 2x10   ABDOM brace with MIP, 2x10 ea R/L  LTR 2x10  Bridge 2x10        NOT TODAY:  SLR  S/L hip ABD      Assessment   Assessment:   Pt tolerated there ex progressions without increases in symptoms. Cues required for all exercises to ensure correct technique and posture.     Plan: Posture, strengthening (Core, erector spinae, glute, hips) with lumbar stabilization    OP EDUCATION: Cont with HEP     Goals:   Active       Mobility       Goal 1       Start:  06/20/24    Expected End:  09/18/24       Pt will improve lumbar spine AROM to WNL to improve I/ADLs         Goal 2       Start:  06/20/24    Expected End:  09/18/24       Pt will improve LE and core strength to 5/5 to improve I/ADLs            Pain       Goal 1       Start:  06/20/24    Expected End:  09/18/24       Pt will  demonstrate normal posture in all positions and during ambulation for >30 min with <3/10 pain to improve I/ADLs.          Goal 2       Start:  06/20/24    Expected End:  09/18/24       Pt will return to all hobbies/recreational activities with <3/10 pain

## 2024-07-29 ENCOUNTER — APPOINTMENT (OUTPATIENT)
Dept: PHYSICAL THERAPY | Facility: CLINIC | Age: 79
End: 2024-07-29
Payer: MEDICARE

## 2024-07-29 DIAGNOSIS — M47.816 SPONDYLOSIS WITHOUT MYELOPATHY OR RADICULOPATHY, LUMBAR REGION: ICD-10-CM

## 2024-07-29 PROCEDURE — 97140 MANUAL THERAPY 1/> REGIONS: CPT | Mod: GP,CQ

## 2024-07-29 PROCEDURE — 97110 THERAPEUTIC EXERCISES: CPT | Mod: GP,CQ

## 2024-07-29 NOTE — PROGRESS NOTES
Physical Therapy Treatment    Patient Name: Ana Bolton  MRN: [id number]  Today's Date: 7/29/2024  Time Calculation  Start Time: 1245  Stop Time: 1325  Time Calculation (min): 40 min  PT Therapeutic Procedures Time Entry  Manual Therapy Time Entry: 20  Therapeutic Exercise Time Entry: 20    Insurance:  Visit number: 3 of 6  Authorization info: EVAL ONLY, AUTH REQUIRED, 35.00 CO PAY, 100% COVERAGE, ANTHEM   Insurance Type: Payor: [employer] MEDICARE / Plan: [employer] MEDICARE ADVANTAGE / Product Type: *No Product type* /     Current Problem   1. Spondylosis without myelopathy or radiculopathy, lumbar region  Follow Up In Physical Therapy          Subjective   General    Pt claims she gets  soreness in her R low back  as the day goes on.  Precautions:   none  Pain    0  Post Treatment Pain Level 0    Objective   Lumbar scoliosis with R convex.  Tightness in R QL    Treatments:  Therapeutic Exercise:   NuStep L5 x6 minutes   Scap add with BTB x 20  GH extension with BTB   Paloff press with BTB x 20 B   LTF x 20  Seated QL stretch     Manual:   UPA's and CPA's  thoracis through lumbar  B  Psoas stretch on R    Assessment   Assessment:    Pt demonstrates a weak core with tightness along R QL    Plan:    Progress with core strengthening and tissue releasing.    OP EDUCATION:   Revised HEP    Goals:   Active       Mobility       Goal 1 (Progressing)       Start:  06/20/24    Expected End:  09/18/24       Pt will improve lumbar spine AROM to WNL to improve I/ADLs         Goal 2 (Progressing)       Start:  06/20/24    Expected End:  09/18/24       Pt will improve LE and core strength to 5/5 to improve I/ADLs            Pain       Goal 1 (Progressing)       Start:  06/20/24    Expected End:  09/18/24       Pt will demonstrate normal posture in all positions and during ambulation for >30 min with <3/10 pain to improve I/ADLs.          Goal 2 (Progressing)       Start:  06/20/24    Expected End:  09/18/24       Pt will return to all  hobbies/recreational activities with <3/10 pain

## 2024-08-01 ENCOUNTER — APPOINTMENT (OUTPATIENT)
Dept: PHYSICAL THERAPY | Facility: CLINIC | Age: 79
End: 2024-08-01
Payer: MEDICARE

## 2024-08-01 ENCOUNTER — APPOINTMENT (OUTPATIENT)
Dept: PRIMARY CARE | Facility: CLINIC | Age: 79
End: 2024-08-01
Payer: MEDICARE

## 2024-08-01 VITALS
HEART RATE: 60 BPM | HEIGHT: 66 IN | SYSTOLIC BLOOD PRESSURE: 125 MMHG | BODY MASS INDEX: 25.23 KG/M2 | DIASTOLIC BLOOD PRESSURE: 78 MMHG | WEIGHT: 157 LBS | OXYGEN SATURATION: 96 %

## 2024-08-01 DIAGNOSIS — I10 PRIMARY HYPERTENSION: Primary | ICD-10-CM

## 2024-08-01 DIAGNOSIS — Z12.31 SCREENING MAMMOGRAM, ENCOUNTER FOR: ICD-10-CM

## 2024-08-01 PROCEDURE — 1160F RVW MEDS BY RX/DR IN RCRD: CPT | Performed by: NURSE PRACTITIONER

## 2024-08-01 PROCEDURE — 3078F DIAST BP <80 MM HG: CPT | Performed by: NURSE PRACTITIONER

## 2024-08-01 PROCEDURE — 1036F TOBACCO NON-USER: CPT | Performed by: NURSE PRACTITIONER

## 2024-08-01 PROCEDURE — 1159F MED LIST DOCD IN RCRD: CPT | Performed by: NURSE PRACTITIONER

## 2024-08-01 PROCEDURE — 3074F SYST BP LT 130 MM HG: CPT | Performed by: NURSE PRACTITIONER

## 2024-08-01 PROCEDURE — 99213 OFFICE O/P EST LOW 20 MIN: CPT | Performed by: NURSE PRACTITIONER

## 2024-08-01 RX ORDER — SIMVASTATIN 40 MG/1
40 TABLET, FILM COATED ORAL DAILY
Qty: 90 TABLET | Refills: 3 | Status: SHIPPED | OUTPATIENT
Start: 2024-08-01 | End: 2025-08-01

## 2024-08-01 RX ORDER — ATENOLOL 100 MG/1
100 TABLET ORAL DAILY
Qty: 90 TABLET | Refills: 3 | Status: SHIPPED | OUTPATIENT
Start: 2024-08-01 | End: 2025-08-01

## 2024-08-01 ASSESSMENT — PATIENT HEALTH QUESTIONNAIRE - PHQ9
1. LITTLE INTEREST OR PLEASURE IN DOING THINGS: NOT AT ALL
2. FEELING DOWN, DEPRESSED OR HOPELESS: NOT AT ALL
SUM OF ALL RESPONSES TO PHQ9 QUESTIONS 1 AND 2: 0

## 2024-08-01 NOTE — PROGRESS NOTES
"Subjective   Patient ID: Ana Bolton is a 78 y.o. female who presents for Blood Pressure Check (4 month bp check, Would like to discuss her physical therapy ).    Hypertension  Patient is here for follow-up of elevated blood pressure. She is exercising and is adherent to a low-salt diet. Blood pressure is well controlled at home. Cardiac symptoms: none. Patient denies chest pain, fatigue, orthopnea, and paroxysmal nocturnal dyspnea. Cardiovascular risk factors: none. Use of agents associated with hypertension: none. History of target organ damage: none.           Review of Systems   All other systems reviewed and are negative.      Objective   /78   Pulse 60   Ht 1.676 m (5' 6\")   Wt 71.2 kg (157 lb)   SpO2 96%   BMI 25.34 kg/m²     Physical Exam  Vitals and nursing note reviewed.   Constitutional:       General: She is not in acute distress.     Appearance: Normal appearance.   HENT:      Head: Normocephalic and atraumatic.      Right Ear: External ear normal.      Left Ear: External ear normal.      Nose: Nose normal.      Mouth/Throat:      Mouth: Mucous membranes are moist.      Pharynx: Oropharynx is clear.   Eyes:      Extraocular Movements: Extraocular movements intact.      Conjunctiva/sclera: Conjunctivae normal.      Pupils: Pupils are equal, round, and reactive to light.   Neck:      Vascular: No carotid bruit.   Cardiovascular:      Rate and Rhythm: Normal rate and regular rhythm.      Pulses: Normal pulses.      Heart sounds: Normal heart sounds.   Pulmonary:      Effort: Pulmonary effort is normal.      Breath sounds: Normal breath sounds.   Musculoskeletal:      Cervical back: Normal range of motion and neck supple.   Lymphadenopathy:      Cervical: No cervical adenopathy.   Skin:     General: Skin is warm and dry.      Capillary Refill: Capillary refill takes less than 2 seconds.   Neurological:      Mental Status: She is alert and oriented to person, place, and time.   Psychiatric:    "      Mood and Affect: Mood normal.         Behavior: Behavior normal.         Thought Content: Thought content normal.         Judgment: Judgment normal.         Assessment/Plan   Problem List Items Addressed This Visit             ICD-10-CM    Hypertension - Primary I10     - in office BP stable  -  continue atenolol  -continue lisinopril   - continue hydrochlorothiazide   - cbc,cmp, lipid   -  follow up in 6 months

## 2024-08-08 ENCOUNTER — TREATMENT (OUTPATIENT)
Dept: PHYSICAL THERAPY | Facility: CLINIC | Age: 79
End: 2024-08-08
Payer: MEDICARE

## 2024-08-08 DIAGNOSIS — M47.816 SPONDYLOSIS WITHOUT MYELOPATHY OR RADICULOPATHY, LUMBAR REGION: ICD-10-CM

## 2024-08-08 PROCEDURE — 97110 THERAPEUTIC EXERCISES: CPT | Mod: GP,CQ

## 2024-08-08 NOTE — PROGRESS NOTES
Physical Therapy Treatment    Patient Name: Ana Bolton  MRN: [id number]  Today's Date: 8/8/2024  Time Calculation  Start Time: 1100  Stop Time: 1140  Time Calculation (min): 40 min  PT Therapeutic Procedures Time Entry  Therapeutic Exercise Time Entry: 40    Insurance:  Visit number: 4 of 6  Authorization info: EVAL ONLY, AUTH REQUIRED, 35.00 CO PAY, 100% COVERAGE, ANTHEM   Insurance Type: Payor: ANTH MEDICARE / Plan: [employer] MEDICARE ADVANTAGE / Product Type: *No Product type* /     Current Problem   1. Spondylosis without myelopathy or radiculopathy, lumbar region  Follow Up In Physical Therapy          Subjective   General    Pt reports improvement but a double header [employer] game yesterday made her hurt somewhat.  Precautions:   none  Pain    5  Post Treatment Pain Level 2    Objective   Pelvic alignment revealed a posterior rotation on L    Treatments:  Therapeutic Exercise:  NuStep L5 x6 minutes   Scap add with BTB x 25  GH extension with BTB x 20   Paloff press with BTB x 20 B  ABD bracing x 10  Bridges with BTB at knees 2 x 10  H/L Hip ABD 2 x 15   LTF x 20 with GTB  Seated QL stretch    Manual:  MET to correct rotation.    Assessment   Assessment:    Pt tolerated session fairly well.     Plan:    Progress a able.    OP EDUCATION:       Goals:   Active       Mobility       Goal 1 (Progressing)       Start:  06/20/24    Expected End:  09/18/24       Pt will improve lumbar spine AROM to WNL to improve I/ADLs         Goal 2 (Progressing)       Start:  06/20/24    Expected End:  09/18/24       Pt will improve LE and core strength to 5/5 to improve I/ADLs            Pain       Goal 1 (Progressing)       Start:  06/20/24    Expected End:  09/18/24       Pt will demonstrate normal posture in all positions and during ambulation for >30 min with <3/10 pain to improve I/ADLs.          Goal 2 (Progressing)       Start:  06/20/24    Expected End:  09/18/24       Pt will return to all hobbies/recreational activities  with <3/10 pain         Active

## 2024-08-12 ENCOUNTER — APPOINTMENT (OUTPATIENT)
Dept: PHYSICAL THERAPY | Facility: CLINIC | Age: 79
End: 2024-08-12
Payer: MEDICARE

## 2024-08-15 ENCOUNTER — APPOINTMENT (OUTPATIENT)
Dept: PHYSICAL THERAPY | Facility: CLINIC | Age: 79
End: 2024-08-15
Payer: MEDICARE

## 2024-08-16 ENCOUNTER — APPOINTMENT (OUTPATIENT)
Dept: PHYSICAL THERAPY | Facility: CLINIC | Age: 79
End: 2024-08-16
Payer: MEDICARE

## 2024-08-16 DIAGNOSIS — M47.816 SPONDYLOSIS WITHOUT MYELOPATHY OR RADICULOPATHY, LUMBAR REGION: ICD-10-CM

## 2024-08-16 PROCEDURE — 97110 THERAPEUTIC EXERCISES: CPT | Mod: GP,CQ

## 2024-08-16 NOTE — PROGRESS NOTES
Physical Therapy Treatment    Patient Name: Ana Bolton  MRN: [id number]  Today's Date: 8/16/2024  Time Calculation  Start Time: 0900  Stop Time: 0940  Time Calculation (min): 40 min  PT Therapeutic Procedures Time Entry  Therapeutic Exercise Time Entry: 40    Insurance:  Visit number: 5 of 6  Authorization info: EVAL ONLY, AUTH REQUIRED, 35.00 CO PAY, 100% COVERAGE, ANTHEM   Insurance Type: Payor: ANTH MEDICARE / Plan: [employer] MEDICARE ADVANTAGE / Product Type: *No Product type* /     Current Problem   1. Spondylosis without myelopathy or radiculopathy, lumbar region  Follow Up In Physical Therapy          Subjective   General    Pt claims her back pain comes on alter in each day. Mornings are pretty good.  Precautions:     Pain    2  Post Treatment Pain Level 0-2...less    Objective   Pt amb more upright but still with a left lean because of her scoliosis    Treatments:  Therapeutic Exercise:   NuStep L5 x6 minutes   Scap add with PTB x 25  GH extension with PTB x 20  LTF x 20 with GTB  Palloff press with BTB x 20 B  ABD bracing x 10  Bridges with BTB at knees 2 x 10  H/L Hip ABD 2 x 15  Reverse curls with ball squeeze  Seated QL stretch      Assessment   Assessment:    Pt is progressing well with her core strengthening    Plan:   Reassess.     OP EDUCATION:       Goals:   Active       Mobility       Goal 1 (Progressing)       Start:  06/20/24    Expected End:  09/18/24       Pt will improve lumbar spine AROM to WNL to improve I/ADLs         Goal 2 (Progressing)       Start:  06/20/24    Expected End:  09/18/24       Pt will improve LE and core strength to 5/5 to improve I/ADLs            Pain       Goal 1 (Progressing)       Start:  06/20/24    Expected End:  09/18/24       Pt will demonstrate normal posture in all positions and during ambulation for >30 min with <3/10 pain to improve I/ADLs.          Goal 2 (Progressing)       Start:  06/20/24    Expected End:  09/18/24       Pt will return to all  hobbies/recreational activities with <3/10 pain           Active

## 2024-08-29 ENCOUNTER — TREATMENT (OUTPATIENT)
Dept: PHYSICAL THERAPY | Facility: CLINIC | Age: 79
End: 2024-08-29
Payer: MEDICARE

## 2024-08-29 DIAGNOSIS — M47.816 SPONDYLOSIS WITHOUT MYELOPATHY OR RADICULOPATHY, LUMBAR REGION: ICD-10-CM

## 2024-08-29 PROCEDURE — 97110 THERAPEUTIC EXERCISES: CPT | Mod: GP | Performed by: PHYSICAL THERAPIST

## 2024-08-29 NOTE — PROGRESS NOTES
Physical Therapy Treatment/Discharge Report    Patient Name: Ana Bolton  MRN: [id number]  Today's Date: 8/29/2024  Time Calculation  Start Time: 1101  Stop Time: 1141  Time Calculation (min): 40 min  PT Therapeutic Procedures Time Entry  Therapeutic Exercise Time Entry: 40    Insurance:  Visit number: 6 of 6  Authorization info: EVAL ONLY, AUTH REQUIRED, 35.00 CO PAY, 100% COVERAGE, ANTHEM   Insurance Type: Payor: ANTH MEDICARE / Plan: [employer] MEDICARE ADVANTAGE / Product Type: *No Product type* /     Current Problem   1. Spondylosis without myelopathy or radiculopathy, lumbar region  Follow Up In Physical Therapy          Subjective   General   Patient feels she is a bit straighter when straightening out her lumbar spine. Notices she is able to walk better, but still gets tired by the end of the day. Finds the exercises have been helping.       Precautions: None  Pain 0/10  Post Treatment Pain Level 0/10    Objective   Lumbar spine AROM:  Flex 100%  Ext 75%  Lat flex L/R 100%  Rot L/R 100%    B LE strength:  Hip flex 5/5  Knee flex 5/5  Knee ext 5/5    Flexibility: WNL  Posture: Thoracic kyphosis with left lateral trunk flexion  Gait: Ambulates with forward flexed posture and left lateral trunk flexion. Otherwise non-antalgic with no enhanced deficits.   Denies numbness/tingling    Treatments:  Therapeutic Exercise:  NuStep L5 x6 minutes  Gastroc stretch at wedge, 30 seconds x 3    Stand hamstring stretch, 30 seconds x 3 ea R/L  Scap add with PTB 3x10  GH extension with PTB 3x10  Palloff press PTB 2x10 ea R/L   Lumbar rot PTB 2x10 ea R/L   Serratus punch 2x10 ea R/L  Lateral trunk flexion to R with crunch, 3x10 (add to HEP)         Assessment   Assessment:   Pt with good progress during therapy and has met her goals. She has improved gait, strength, and AROM, however remains with postural deficits that will most likely be long-term. Pt to be discharged at this time and continue with HEP on her own. She is in  good understanding.     Plan:  Discharge    OP EDUCATION: Cont with HEP on own.     Goals:   Resolved       Mobility       Goal 1 (Met)       Start:  06/20/24    Expected End:  09/18/24    Resolved:  08/29/24    Pt will improve lumbar spine AROM to WNL to improve I/ADLs         Goal 2 (Met)       Start:  06/20/24    Expected End:  09/18/24    Resolved:  08/29/24    Pt will improve LE and core strength to 5/5 to improve I/ADLs            Pain       Goal 1 (Met)       Start:  06/20/24    Expected End:  09/18/24    Resolved:  08/29/24    Pt will demonstrate normal posture in all positions and during ambulation for >30 min with <3/10 pain to improve I/ADLs.          Goal 2 (Met)       Start:  06/20/24    Expected End:  09/18/24    Resolved:  08/29/24    Pt will return to all hobbies/recreational activities with <3/10 pain

## 2024-09-16 ENCOUNTER — APPOINTMENT (OUTPATIENT)
Dept: RADIOLOGY | Facility: CLINIC | Age: 79
End: 2024-09-16
Payer: MEDICARE

## 2024-09-24 ENCOUNTER — APPOINTMENT (OUTPATIENT)
Dept: RADIOLOGY | Facility: CLINIC | Age: 79
End: 2024-09-24
Payer: MEDICARE

## 2024-09-26 ENCOUNTER — HOSPITAL ENCOUNTER (OUTPATIENT)
Dept: RADIOLOGY | Facility: CLINIC | Age: 79
Discharge: HOME | End: 2024-09-26
Payer: MEDICARE

## 2024-09-26 VITALS — WEIGHT: 156.97 LBS | BODY MASS INDEX: 25.23 KG/M2 | HEIGHT: 66 IN

## 2024-09-26 DIAGNOSIS — Z12.31 SCREENING MAMMOGRAM, ENCOUNTER FOR: ICD-10-CM

## 2024-09-26 PROCEDURE — 77067 SCR MAMMO BI INCL CAD: CPT

## 2024-12-18 ENCOUNTER — APPOINTMENT (OUTPATIENT)
Dept: ORTHOPEDIC SURGERY | Facility: CLINIC | Age: 79
End: 2024-12-18
Payer: MEDICARE

## 2025-01-07 ENCOUNTER — APPOINTMENT (OUTPATIENT)
Dept: RADIOLOGY | Facility: CLINIC | Age: 80
End: 2025-01-07
Payer: MEDICARE

## 2025-01-13 ENCOUNTER — APPOINTMENT (OUTPATIENT)
Dept: ORTHOPEDIC SURGERY | Facility: CLINIC | Age: 80
End: 2025-01-13
Payer: MEDICARE

## 2025-01-13 ENCOUNTER — APPOINTMENT (OUTPATIENT)
Dept: RADIOLOGY | Facility: CLINIC | Age: 80
End: 2025-01-13
Payer: MEDICARE

## 2025-01-15 ENCOUNTER — ANCILLARY ORDERS (OUTPATIENT)
Dept: PRIMARY CARE | Facility: CLINIC | Age: 80
End: 2025-01-15

## 2025-01-15 ENCOUNTER — HOSPITAL ENCOUNTER (OUTPATIENT)
Dept: RADIOLOGY | Facility: CLINIC | Age: 80
Discharge: HOME | End: 2025-01-15
Payer: MEDICARE

## 2025-01-15 ENCOUNTER — APPOINTMENT (OUTPATIENT)
Dept: PRIMARY CARE | Facility: CLINIC | Age: 80
End: 2025-01-15
Payer: MEDICARE

## 2025-01-15 VITALS — HEIGHT: 66 IN | HEART RATE: 62 BPM | WEIGHT: 156 LBS | OXYGEN SATURATION: 96 % | BODY MASS INDEX: 25.07 KG/M2

## 2025-01-15 DIAGNOSIS — I10 PRIMARY HYPERTENSION: ICD-10-CM

## 2025-01-15 DIAGNOSIS — Z12.39 BREAST SCREENING: ICD-10-CM

## 2025-01-15 DIAGNOSIS — N18.32 CHRONIC KIDNEY DISEASE, STAGE 3B (MULTI): ICD-10-CM

## 2025-01-15 DIAGNOSIS — Z12.31 ENCOUNTER FOR SCREENING MAMMOGRAM FOR MALIGNANT NEOPLASM OF BREAST: ICD-10-CM

## 2025-01-15 DIAGNOSIS — Z78.0 ASYMPTOMATIC MENOPAUSAL STATE: ICD-10-CM

## 2025-01-15 DIAGNOSIS — Z13.220 SCREENING FOR HYPERLIPIDEMIA: ICD-10-CM

## 2025-01-15 DIAGNOSIS — Z00.00 ROUTINE GENERAL MEDICAL EXAMINATION AT HEALTH CARE FACILITY: Primary | ICD-10-CM

## 2025-01-15 DIAGNOSIS — Z13.820 SCREENING FOR OSTEOPOROSIS: ICD-10-CM

## 2025-01-15 DIAGNOSIS — Z23 NEED FOR VACCINATION: ICD-10-CM

## 2025-01-15 DIAGNOSIS — M25.572 ACUTE LEFT ANKLE PAIN: ICD-10-CM
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
## 2025-01-15 DIAGNOSIS — E55.9 VITAMIN D DEFICIENCY: ICD-10-CM

## 2025-01-15 PROCEDURE — 99397 PER PM REEVAL EST PAT 65+ YR: CPT

## 2025-01-15 PROCEDURE — 73610 X-RAY EXAM OF ANKLE: CPT | Mod: LT

## 2025-01-15 PROCEDURE — G0009 ADMIN PNEUMOCOCCAL VACCINE: HCPCS

## 2025-01-15 PROCEDURE — 1159F MED LIST DOCD IN RCRD: CPT

## 2025-01-15 PROCEDURE — 1160F RVW MEDS BY RX/DR IN RCRD: CPT

## 2025-01-15 PROCEDURE — 1170F FXNL STATUS ASSESSED: CPT

## 2025-01-15 PROCEDURE — 73610 X-RAY EXAM OF ANKLE: CPT | Mod: LEFT SIDE | Performed by: RADIOLOGY

## 2025-01-15 PROCEDURE — 90677 PCV20 VACCINE IM: CPT

## 2025-01-15 PROCEDURE — 1036F TOBACCO NON-USER: CPT

## 2025-01-15 RX ORDER — ATENOLOL 100 MG/1
100 TABLET ORAL DAILY
Qty: 90 TABLET | Refills: 3 | Status: SHIPPED | OUTPATIENT
Start: 2025-01-15 | End: 2025-01-15 | Stop reason: SDUPTHER

## 2025-01-15 RX ORDER — LISINOPRIL 40 MG/1
40 TABLET ORAL DAILY
Qty: 90 TABLET | Refills: 3 | Status: SHIPPED | OUTPATIENT
Start: 2025-01-15 | End: 2026-01-15

## 2025-01-15 RX ORDER — SIMVASTATIN 40 MG/1
40 TABLET, FILM COATED ORAL DAILY
Qty: 90 TABLET | Refills: 3 | Status: SHIPPED | OUTPATIENT
Start: 2025-01-15 | End: 2026-01-15

## 2025-01-15 RX ORDER — ATENOLOL 100 MG/1
100 TABLET ORAL DAILY
Qty: 90 TABLET | Refills: 3 | Status: SHIPPED | OUTPATIENT
Start: 2025-01-15 | End: 2026-01-15

## 2025-01-15 RX ORDER — HYDROCHLOROTHIAZIDE 25 MG/1
25 TABLET ORAL DAILY
Qty: 90 TABLET | Refills: 3 | Status: SHIPPED | OUTPATIENT
Start: 2025-01-15 | End: 2026-01-15

## 2025-01-15 ASSESSMENT — ENCOUNTER SYMPTOMS
LIGHT-HEADEDNESS: 0
RHINORRHEA: 0
PSYCHIATRIC NEGATIVE: 1
LOSS OF SENSATION: 0
HEMATURIA: 0
WEAKNESS: 0
FACIAL ASYMMETRY: 0
JOINT SWELLING: 0
HYPERTENSION: 1
INABILITY TO BEAR WEIGHT: 0
ABDOMINAL PAIN: 0
TROUBLE SWALLOWING: 0
FATIGUE: 0
WHEEZING: 0
UNEXPECTED WEIGHT CHANGE: 0
SEIZURES: 0
PALPITATIONS: 0
SINUS PRESSURE: 0
SORE THROAT: 0
COUGH: 0
ARTHRALGIAS: 0
WOUND: 0
NECK PAIN: 1
SINUS PAIN: 0
DYSURIA: 0
CONSTIPATION: 0
NAUSEA: 0
BACK PAIN: 0
SHORTNESS OF BREATH: 1
FEVER: 0

## 2025-01-15 ASSESSMENT — ACTIVITIES OF DAILY LIVING (ADL)
DRESSING: INDEPENDENT
MANAGING_FINANCES: INDEPENDENT
BATHING: INDEPENDENT
DOING_HOUSEWORK: INDEPENDENT
TAKING_MEDICATION: INDEPENDENT
GROCERY_SHOPPING: INDEPENDENT

## 2025-01-15 NOTE — ASSESSMENT & PLAN NOTE
Orders:    atenolol (Tenormin) 100 mg tablet; Take 1 tablet (100 mg) by mouth once daily.    hydroCHLOROthiazide (HYDRODiuril) 25 mg tablet; Take 1 tablet (25 mg) by mouth once daily.    lisinopril 40 mg tablet; Take 1 tablet (40 mg) by mouth once daily.    simvastatin (Zocor) 40 mg tablet; Take 1 tablet (40 mg) by mouth once daily.

## 2025-01-15 NOTE — PROGRESS NOTES
Subjective   Reason for Visit: Ana Bolton is an 79 y.o. female here for a Medicare Wellness visit.   /108 Transfer of care from [facility] to [facility] Concerns with breast size changes (discuss reduction) and body disproportion (does have MRI scheduled) and rolled left ankle 1 month ago and still swollen.   Past Medical, Surgical, and Family History reviewed and updated in chart.    Reviewed all medications by prescribing practitioner or clinical pharmacist (such as prescriptions, OTCs, herbal therapies and supplements) and documented in the medical record.    Pt is here for annual wellness.  Reports overall health is good but not excellent    Do you take any herbs or supplements that were not prescribed by a doctor? no  Are you taking calcium supplements? no  Are you taking aspirin daily? no  Colonoscopy: up to date  Fasting blood work: April 2024  Last eye exam: every 2 years  Last dental Exam: dec 2024  Exercise:likes to walk  Mood:pleasant  Sleep: well  Diet:  not great  Occupation:  retired nurse, cruises/travel/casions  Do you have pain that bothers you in your daily life? yes    1. Patient Counseling:  --Nutrition: Stressed importance of moderation in sodium/caffeine intake, saturated fat and cholesterol, caloric balance, sufficient intake of fresh fruits, vegetables, fiber, calcium, iron, and 1 mg of folate supplement per day (for females capable of pregnancy).  --Discussed the issue of estrogen replacement, calcium supplement, and the daily use of baby aspirin as appropriate per pt.  --Exercise: Stressed the importance of regular exercise.   --Substance Abuse: Discussed cessation/primary prevention of tobacco, alcohol, or other drug use; driving or other dangerous activities under the influence; availability of treatment for abuse.    --Sexuality: Discussed sexually transmitted diseases, partner selection, use of condoms, avoidance of unintended pregnancy  and contraceptive alternatives.   --Injury  prevention: Discussed safety belts, safety helmets, smoke detector, smoking near bedding or upholstery.   --Dental health: Discussed importance of regular tooth brushing, flossing, and dental visits.  --Immunizations reviewed.  --Discussed benefits of colon cancer screening.  --After hours service discussed with patient  2 Discussed the patient's BMI.  The BMI is in the acceptable range.  3 Follow up as needed for acute illness        Ankle Injury   Incident onset: 12/12/24. The incident occurred at home. The injury mechanism was a twisting injury and a fall (slipped out of shower). The pain is present in the left ankle. The quality of the pain is described as aching. The pain is moderate. The pain has been Fluctuating since onset. Pertinent negatives include no inability to bear weight or loss of sensation. Associated symptoms comments: Swelling  . She reports no foreign bodies present. The symptoms are aggravated by weight bearing. She has tried immobilization for the symptoms. The treatment provided mild relief.   Hypertension  This is a chronic problem. The current episode started more than 1 year ago. The problem is unchanged. The problem is controlled. Associated symptoms include malaise/fatigue, neck pain and shortness of breath. Pertinent negatives include no chest pain, palpitations or peripheral edema. There are no associated agents to hypertension. There are no known risk factors for coronary artery disease. Past treatments include diuretics, calcium channel blockers and ACE inhibitors. The current treatment provides significant improvement. There are no compliance problems.        Patient Care Team:  FLY Mensah-CNP as PCP - General (Family Medicine)  Bess Spencer DO as PCP - [employer] Medicare Advantage PCP     Review of Systems   Constitutional:  Positive for malaise/fatigue. Negative for fatigue, fever and unexpected weight change.   HENT:  Negative for congestion, ear discharge, ear pain,  "nosebleeds, postnasal drip, rhinorrhea, sinus pressure, sinus pain, sneezing, sore throat and trouble swallowing.    Respiratory:  Positive for shortness of breath. Negative for cough and wheezing.    Cardiovascular:  Negative for chest pain, palpitations and leg swelling.   Gastrointestinal:  Negative for abdominal pain, constipation and nausea.   Genitourinary:  Negative for dysuria, hematuria, menstrual problem, pelvic pain, vaginal bleeding and vaginal pain.   Musculoskeletal:  Positive for neck pain. Negative for arthralgias, back pain, gait problem and joint swelling.   Skin:  Negative for rash and wound.   Neurological:  Negative for seizures, facial asymmetry, weakness and light-headedness.   Psychiatric/Behavioral: Negative.         Objective   Vitals:  Pulse 62   Ht 1.676 m (5' 5.98\")   Wt 70.8 kg (156 lb)   SpO2 96%   BMI 25.19 kg/m²       Physical Exam  Constitutional:       Appearance: Normal appearance.   HENT:      Head: Normocephalic and atraumatic.      Right Ear: Tympanic membrane and external ear normal.      Left Ear: Tympanic membrane and external ear normal.      Nose: Nose normal.      Mouth/Throat:      Mouth: Mucous membranes are moist.      Pharynx: Oropharynx is clear. Uvula midline.   Eyes:      General: Lids are normal.      Extraocular Movements: Extraocular movements intact.      Pupils: Pupils are equal, round, and reactive to light.   Neck:      Thyroid: No thyromegaly or thyroid tenderness.   Cardiovascular:      Rate and Rhythm: Normal rate and regular rhythm.      Heart sounds: Normal heart sounds.   Pulmonary:      Effort: Pulmonary effort is normal.      Breath sounds: Normal breath sounds.   Chest:   Breasts:     Breasts are asymmetrical.      Right: No inverted nipple, mass, nipple discharge, skin change or tenderness.      Left: No inverted nipple, mass, nipple discharge, skin change or tenderness.   Abdominal:      General: Bowel sounds are normal.      Palpations: " Abdomen is soft.      Tenderness: There is no abdominal tenderness. There is no guarding.   Musculoskeletal:         General: No swelling.      Cervical back: Normal range of motion.      Thoracic back: Scoliosis present.      Lumbar back: Bony tenderness present. Decreased range of motion. Scoliosis present.      Right lower leg: No edema.      Left lower leg: Swelling present. No edema.      Right ankle: Normal.      Left ankle: Swelling present. Tenderness present. Decreased range of motion.   Lymphadenopathy:      Head:      Right side of head: No submental, submandibular or tonsillar adenopathy.      Left side of head: No submental, submandibular or tonsillar adenopathy.      Cervical: No cervical adenopathy.   Skin:     General: Skin is warm and dry.      Capillary Refill: Capillary refill takes less than 2 seconds.      Coloration: Skin is not jaundiced.      Findings: No lesion or rash.   Neurological:      General: No focal deficit present.      Mental Status: She is alert and oriented to person, place, and time.   Psychiatric:         Mood and Affect: Mood normal.         Behavior: Behavior normal.         Thought Content: Thought content normal.         Judgment: Judgment normal.         Assessment & Plan  Routine general medical examination at health care facility    Orders:    1 Year Follow Up In Primary Care - Wellness Exam; Future    Comprehensive Metabolic Panel; Future    TSH with reflex to Free T4 if abnormal; Future    Screening for hyperlipidemia    Orders:    Lipid Panel; Future    Acute left ankle pain    Orders:    XR ankle left 2 views; Future    Fall, initial encounter    Orders:    XR ankle left 2 views; Future    Screening for osteoporosis    Orders:    XR DEXA bone density    Vitamin D deficiency    Orders:    Vitamin D 25-Hydroxy,Total (for eval of Vitamin D levels); Future    Breast screening    Orders:    BI mammo bilateral screening tomosynthesis; Future    Asymptomatic menopausal  state    Orders:    XR DEXA bone density    Encounter for screening mammogram for malignant neoplasm of breast    Orders:    BI mammo bilateral screening tomosynthesis; Future    Primary hypertension    Orders:    atenolol (Tenormin) 100 mg tablet; Take 1 tablet (100 mg) by mouth once daily.    hydroCHLOROthiazide (HYDRODiuril) 25 mg tablet; Take 1 tablet (25 mg) by mouth once daily.    lisinopril 40 mg tablet; Take 1 tablet (40 mg) by mouth once daily.    simvastatin (Zocor) 40 mg tablet; Take 1 tablet (40 mg) by mouth once daily.    Need for vaccination    Orders:    Pneumococcal conjugate vaccine, 20-valent (PREVNAR 20)    Chronic kidney disease, stage 3b (Multi)    Orders:    Albumin-Creatinine Ratio, Urine Random; Future         Ana was seen today for medicare annual wellness visit subsequent.  Diagnoses and all orders for this visit:  Routine general medical examination at health care facility  -     1 Year Follow Up In Primary Care - Wellness Exam; Future  -     Comprehensive Metabolic Panel; Future  -     TSH with reflex to Free T4 if abnormal; Future  Screening for hyperlipidemia  -     Lipid Panel; Future  Acute left ankle pain  -     XR ankle left 2 views; Future  Fall, initial encounter  -     XR ankle left 2 views; Future  Screening for osteoporosis  -     XR DEXA bone density  Vitamin D deficiency  -     Vitamin D 25-Hydroxy,Total (for eval of Vitamin D levels); Future  Breast screening  Comments:  also gave name of Dr krishnamurthy as pt would like to talk about breast reduction due to significant asyemetry.  Orders:  -     BI mammo bilateral screening tomosynthesis; Future  Asymptomatic menopausal state  -     XR DEXA bone density  Encounter for screening mammogram for malignant neoplasm of breast  -     BI mammo bilateral screening tomosynthesis; Future  Primary hypertension  -     Discontinue: atenolol (Tenormin) 100 mg tablet; Take 1 tablet (100 mg) by mouth once daily.  -     atenolol (Tenormin) 100 mg  tablet; Take 1 tablet (100 mg) by mouth once daily.  -     hydroCHLOROthiazide (HYDRODiuril) 25 mg tablet; Take 1 tablet (25 mg) by mouth once daily.  -     lisinopril 40 mg tablet; Take 1 tablet (40 mg) by mouth once daily.  -     simvastatin (Zocor) 40 mg tablet; Take 1 tablet (40 mg) by mouth once daily.  Need for vaccination  -     Pneumococcal conjugate vaccine, 20-valent (PREVNAR 20)  Chronic kidney disease, stage 3b (Multi)  Comments:  labs ordered today

## 2025-01-20 ENCOUNTER — HOSPITAL ENCOUNTER (OUTPATIENT)
Dept: RADIOLOGY | Facility: CLINIC | Age: 80
Discharge: HOME | End: 2025-01-20
Payer: MEDICARE

## 2025-01-20 DIAGNOSIS — M54.16 RADICULOPATHY, LUMBAR REGION: ICD-10-CM

## 2025-01-20 DIAGNOSIS — M48.061 SPINAL STENOSIS, LUMBAR REGION WITHOUT NEUROGENIC CLAUDICATION: ICD-10-CM

## 2025-01-20 PROCEDURE — 72148 MRI LUMBAR SPINE W/O DYE: CPT

## 2025-01-20 PROCEDURE — 72148 MRI LUMBAR SPINE W/O DYE: CPT | Performed by: RADIOLOGY

## 2025-01-22 NOTE — PROGRESS NOTES
"New patient  History Of Present Illness  Ana Bolton is a 79 y.o. female presenting with LEFT ankle pain. Pt states that between Thanksgiving and [place] she \"twisted her ankle\". States that she went back and forth with if she should get it checked out. When she went to see her primary care provider for her annual check up last week, she took a look at it and had ordered an xray. Pt was then referred in to office for further evaluation. Pain located on the top of the foot and in the medial aspect of the ankle. Pain with all range of motion. Notes that by end of the day that her foot will be swollen. She also has pain when sleeping at night with movement. States that once her foot/ankle is swollen it will feel numb. Rates current pain as a 1/10 but states that it can get up to a 5/10.  We reviewed patient x-ray results in detail.  Patient verbalizes understanding of results.  We discussed treatment options and will have patient placed into a tall walking boot and we will order an MRI to evaluate for possible syndesmotic disruption as evidenced on physical exam as well as noted as a possible concern on her x-ray.  Advised patient to use a cane when ambulating due to her feeling awkward in the boot and also to use an even up shoe attachment to help with increased ability.  Patient can follow-up after MRI and we can adjust treatment as indicated with consideration of referral to orthopedic surgery as necessary.  Patient can follow-up in 3 to 4 weeks for kavin-ray and reevaluation.  Patient verbalizes understanding and agreement with plan of care.    Past Medical History  She has a past medical history of Allergic contact dermatitis due to plants, except food (07/16/2019), Body mass index (BMI) 26.0-26.9, adult (05/10/2021), Breast mass (05/23/2023), Encounter for general adult medical examination without abnormal findings (04/08/2016), Encounter for screening, unspecified (12/16/2014), Essential (primary) " "hypertension (03/18/2013), Impacted cerumen, bilateral (12/30/2020), Low back pain, unspecified (04/14/2020), Lumbar radiculopathy (05/23/2023), Osteoarthritis of knee (05/23/2023), Other conditions influencing health status (03/17/2014), Other dental procedure status, 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), Personal history of other benign neoplasm (04/03/2013), Personal history of other diseases of urinary system (12/16/2014), Personal history of other endocrine, nutritional and metabolic disease (05/08/2020), Personal history of other infectious and parasitic diseases (06/10/2020), Personal history of other specified conditions (03/22/2017), Personal history of urinary calculi (03/22/2017), Polyosteoarthritis, unspecified (05/23/2023), Pure hypercholesterolemia, unspecified (03/18/2013), 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    Surgical History  She has a past surgical history that includes Knee surgery (03/18/2013).     Social History  She reports that she has never smoked. She has never used smokeless tobacco. She reports that she does not currently use alcohol. She reports that she does not use drugs.    Family History  Family History   Problem Relation Name Age of Onset    Colon cancer Mother      Heart attack Father      Breast cancer Sister      Other (cabg) Brother      Heart disease Brother       Allergies  Sulfa (sulfonamide antibiotics)    Review of Systems  Review of Systems   Constitutional: Negative.    Respiratory: Negative.     Cardiovascular: Negative.    Musculoskeletal:  Positive for arthralgias and myalgias.   All other systems reviewed and are negative.    Last Recorded Vitals  /76 (BP Location: Right arm, Patient Position: Sitting, BP Cuff Size: Adult)   Pulse 62   Ht 1.651 m (5' 5\")   Wt 70.8 kg (156 lb)   BMI 25.96 " kg/m²      The , MASOOD CODY was present during today's visit and not limited to physical examination!    Examination:  Left Ankle and/or Foot  Edema: Positive.   Ecchymosis/Bruising: Negative.   Percussion Test: Positive           Tuning Fork Test: Positive     Orientation:   Positive  Asymmetrical,because of the swelling.          ROM:   Positive, Decreased plantarflexion and dorsiflexion due to pain            Muscle Strength: Positive   +3/+5 Planar Flexion, Decreased due to pain  +3/+5  Dorsi Flexion, Decreased due to pain     +3/+5 Inversion  +3/+5 Eversion        +3/+5 Plantarflexion in combination with inversion  +3/+5 Plantarflexion in combination with eversion          +3/+5 Dorsiflexion in combination with inversion (Posterior Tibialis)  +3/+5 Dorsiflexion in combination with eversion (Peroneal Brevis)  +3/+5 Dorsiflexion in combination with eversion and flexion of great toe (Peroneal Longus).            Palpation:   Positive, Painful and Tender to Palpation Left Medial Cuneiform AND dorsal aspect of foot           Vascular:   +2/+4 Dorsalis Pedis  +2/+4 Posterior Tibialis  Capillary Refill < 2 Seconds.        Leg/Ankle/Foot - Ankle Impingement:  Posterior Ankle Impingement Test: Negative.   Anterior Ankle Impingement Test: Negative.         Leg/Ankle/Foot - Ankle Instability:  Flexibility Test:  Positive.   Anterior Drawer Test:  Negative.   Talar Tilt Test:  Negative.   External Rotation Kleiger Plantar Flexion Test: Positive  External Rotation Kleiger Dorsiflexion Test:  Positive  Squeeze Test:  Positive  Dorsiflexion Test:  Negative.   Posterior Tibial Instability Test: Negative.  Peroneal Tendon Instability Test:  Negative.  Horizontal Squeeze Test:  Positive.   Vertical Squeeze Test:  Positive.   Standing/Walking Test:  Positive, Painful.         Leg/Ankle/Foot - DVT:  Ev's Sign: Negative.         Leg/Ankle/Foot - Forefoot:  Strunsky Test:  Negative.   Gaenslen Maneuver Test:   Negative.   Metatarsal Tap Test: Negative.     Crepitation Test:  Negative.         Leg/Ankle/Foot - Hindfoot Achilles/Calcaneus:  Orourke Compression Test: Negative.   Hoffa Sign: Negative.   Achilles Tendon Tap Test: Negative.   Heel Compression Test: Negative.         Leg/Ankle/Foot - Nerve Irritation:  Andrea Sign: Negative.   Digital Nerve Stretch Test: Negative.   Tinel Sign: Negative.   Tourniquet Sign: Negative.         Feet/Foot:   Positive BILATERAL  Valgus foot     Imaging and Diagnostics Review:  Plain radiographs independently reviewed.    Assessment   1. Nondisplaced fracture of distal end of left fibula    2. Acute left ankle pain    3. Fall, initial encounter    4. High ankle sprain, left, initial encounter    5. Sprain of left ankle, initial encounter    6. Syndesmotic disruption of ankle, left, initial encounter        Plan   Treatment or Intervention:  May use PRICE therapy as needed.  Start into Physical Therapy 1-2 times a week for 8-10 weeks with manual therapy as well as dry needling and IASTM  Stressed the importance of wearing shoes with good stability control to help with the biomechanics affecting the lower legs  Stressed the importance of wearing full foot insoles to help with the biomechanics affecting the lower legs  Recommendation over-the-counter calcium 500mg, 3 times a day with vitamin-D3 5666-1789+ units a day with food as well as a daily multivitamin  Recommendation over-the-counter Move Free for joint health  May take OTC Tylenol Extra Strength or OTC Tylenol Arthritis, taking one every 6-8 hours with food as needed for pain management.  Patient advised regarding the risk and/or potential adverse reactions and/or side effects of any prescribed medications along with any over-the-counter medications or any supplements used. Patient advised to seek immediate medical care if any adverse reactions occur. The patient and/or patient(s) parent(s) verbalized their  understanding.  Discussed in detail with the patient to the level of their understanding the possibility in the future of regenerative injections versus corticosteroids injections,   MRI of LEFT ANKLE to rule out tendon vs ligament vs tear vs fracture vs other, MSK to read.  Patient was given a TALL WALKING BOOT brace for their LEFT ANKLE injury/condition. The patient is ambulatory with or without aid and feels more stable with the brace on. The brace definitely helps improve their function as well as stability. Verbal and written instructions for the use, wear schedule, cleaning and application of this brace were given. Patient was instructed that should the brace result in increased pain, decreased sensation, numbness and/or tingling, increased swelling, or an overall worsening of their medical condition; to please contact our office immediately. Orthotic/brace management and training was provided for skin care, modifications due to healing tissues, edema changes, interruption in skin integrity and safety precautions with the Orthosis/brace.     Diagnostic studies:  Interpreted By:  Deon Fitzgerald,   STUDY: XR ANKLE LEFT 3+ VIEWS      INDICATION: Signs/Symptoms:post fall, swelling/pain in ankle.      COMPARISON: None      ACCESSION NUMBER(S): [id number]      ORDERING CLINICIAN: MASOOD ARDON      FINDINGS:  Nondisplaced oblique fracture left distal fibula at the metaphysis.      Slightly widened medial mortise could suggest a component of  ligamentous injury.      Midfoot arthrosis and some calcaneal spurs as well as prior ankle  sprains with posttraumatic heterotopic ossification.      IMPRESSION:   Nondisplaced oblique fracture left distal fibula at the metaphysis.      Slightly widened medial mortise could suggest a component of  ligamentous injury.      Signed by: Deon Fitzgerald 1/18/2025 6:10 PM  Dictation workstation:   [id number]    Activity Instructions, Restrictions, and  Accommodations:      Consultations/Referrals:  Physical therapy    Follow-up:  Follow up after MRI  Total appointment time _45__ minutes. Greater than 50% spent counseling patient on results of physical exam, treatment options as well as reasons for ordered imaging and anticipated treatment for possible results, need for PT and expected outcomes, as well as discussing possible medications.       Jayy Andrews CNP

## 2025-01-23 ENCOUNTER — OFFICE VISIT (OUTPATIENT)
Dept: SPORTS MEDICINE | Facility: CLINIC | Age: 80
End: 2025-01-23
Payer: MEDICARE

## 2025-01-23 VITALS
WEIGHT: 156 LBS | BODY MASS INDEX: 25.99 KG/M2 | HEART RATE: 62 BPM | DIASTOLIC BLOOD PRESSURE: 76 MMHG | SYSTOLIC BLOOD PRESSURE: 122 MMHG | HEIGHT: 65 IN

## 2025-01-23 DIAGNOSIS — S82.832A NONDISPLACED FRACTURE OF DISTAL END OF LEFT FIBULA: Primary | ICD-10-CM

## 2025-01-23 DIAGNOSIS — S93.492A HIGH ANKLE SPRAIN, LEFT, INITIAL ENCOUNTER: ICD-10-CM

## 2025-01-23 DIAGNOSIS — S93.432A SYNDESMOTIC DISRUPTION OF ANKLE, LEFT, INITIAL ENCOUNTER: ICD-10-CM
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
## 2025-01-23 DIAGNOSIS — S93.402A SPRAIN OF LEFT ANKLE, INITIAL ENCOUNTER: ICD-10-CM

## 2025-01-23 DIAGNOSIS — M25.572 ACUTE LEFT ANKLE PAIN: ICD-10-CM

## 2025-01-23 ASSESSMENT — ENCOUNTER SYMPTOMS
CARDIOVASCULAR NEGATIVE: 1
RESPIRATORY NEGATIVE: 1
ARTHRALGIAS: 1
OCCASIONAL FEELINGS OF UNSTEADINESS: 0
MYALGIAS: 1
DEPRESSION: 0
CONSTITUTIONAL NEGATIVE: 1
LOSS OF SENSATION IN FEET: 0

## 2025-01-23 ASSESSMENT — PAIN SCALES - GENERAL: PAINLEVEL_OUTOF10: 1

## 2025-01-23 ASSESSMENT — PATIENT HEALTH QUESTIONNAIRE - PHQ9
2. FEELING DOWN, DEPRESSED OR HOPELESS: NOT AT ALL
SUM OF ALL RESPONSES TO PHQ9 QUESTIONS 1 AND 2: 0
1. LITTLE INTEREST OR PLEASURE IN DOING THINGS: NOT AT ALL

## 2025-01-23 NOTE — PATIENT INSTRUCTIONS
May use PRICE therapy as needed.  Start into Physical Therapy 1-2 times a week for 8-10 weeks with manual therapy as well as dry needling and IASTM  Stressed the importance of wearing shoes with good stability control to help with the biomechanics affecting the lower legs  Stressed the importance of wearing full foot insoles to help with the biomechanics affecting the lower legs  Recommendation over-the-counter calcium 500mg, 3 times a day with vitamin-D3 5673-0254+ units a day with food as well as a daily multivitamin  Recommendation over-the-counter Move Free for joint health  May take OTC Tylenol Extra Strength or OTC Tylenol Arthritis, taking one every 6-8 hours with food as needed for pain management.  Patient advised regarding the risk and/or potential adverse reactions and/or side effects of any prescribed medications along with any over-the-counter medications or any supplements used. Patient advised to seek immediate medical care if any adverse reactions occur. The patient and/or patient(s) parent(s) verbalized their understanding.  Discussed in detail with the patient to the level of their understanding the possibility in the future of regenerative injections versus corticosteroids injections,   MRI of LEFT ANKLE to rule out tendon vs ligament vs tear vs fracture vs other, MSK to read.  Patient was given a TALL WALKING BOOT brace for their LEFT ANKLE injury/condition. The patient is ambulatory with or without aid and feels more stable with the brace on. The brace definitely helps improve their function as well as stability. Verbal and written instructions for the use, wear schedule, cleaning and application of this brace were given. Patient was instructed that should the brace result in increased pain, decreased sensation, numbness and/or tingling, increased swelling, or an overall worsening of their medical condition; to please contact our office immediately. Orthotic/brace management and training was  provided for skin care, modifications due to healing tissues, edema changes, interruption in skin integrity and safety precautions with the Orthosis/brace.   Follow up after MRI of LEFT ankle and rexray in 3-4 weeks    You have been ordered an MRI of the LEFT ANKLE. Once you contact scheduling at [phone] and obtain the date and time of your MRI, contact our office at [phone] to schedule your follow-up appointment to review your results.

## 2025-01-27 ENCOUNTER — HOSPITAL ENCOUNTER (OUTPATIENT)
Dept: RADIOLOGY | Facility: CLINIC | Age: 80
Discharge: HOME | End: 2025-01-27
Payer: MEDICARE

## 2025-01-27 ENCOUNTER — OFFICE VISIT (OUTPATIENT)
Dept: ORTHOPEDIC SURGERY | Facility: CLINIC | Age: 80
End: 2025-01-27
Payer: MEDICARE

## 2025-01-27 VITALS — BODY MASS INDEX: 25.99 KG/M2 | HEIGHT: 65 IN | WEIGHT: 156 LBS

## 2025-01-27 DIAGNOSIS — M54.40 CHRONIC LOW BACK PAIN WITH SCIATICA, SCIATICA LATERALITY UNSPECIFIED, UNSPECIFIED BACK PAIN LATERALITY: ICD-10-CM

## 2025-01-27 DIAGNOSIS — G89.29 CHRONIC BILATERAL LOW BACK PAIN WITHOUT SCIATICA: ICD-10-CM

## 2025-01-27 DIAGNOSIS — M41.50 DEGENERATIVE SCOLIOSIS IN ADULT PATIENT: Primary | ICD-10-CM

## 2025-01-27 DIAGNOSIS — G89.29 CHRONIC LOW BACK PAIN WITH SCIATICA, SCIATICA LATERALITY UNSPECIFIED, UNSPECIFIED BACK PAIN LATERALITY: ICD-10-CM

## 2025-01-27 DIAGNOSIS — M54.50 CHRONIC BILATERAL LOW BACK PAIN WITHOUT SCIATICA: ICD-10-CM

## 2025-01-27 PROCEDURE — G2211 COMPLEX E/M VISIT ADD ON: HCPCS | Performed by: STUDENT IN AN ORGANIZED HEALTH CARE EDUCATION/TRAINING PROGRAM

## 2025-01-27 PROCEDURE — 1125F AMNT PAIN NOTED PAIN PRSNT: CPT | Performed by: STUDENT IN AN ORGANIZED HEALTH CARE EDUCATION/TRAINING PROGRAM

## 2025-01-27 PROCEDURE — 99214 OFFICE O/P EST MOD 30 MIN: CPT | Performed by: STUDENT IN AN ORGANIZED HEALTH CARE EDUCATION/TRAINING PROGRAM

## 2025-01-27 PROCEDURE — 99204 OFFICE O/P NEW MOD 45 MIN: CPT | Performed by: STUDENT IN AN ORGANIZED HEALTH CARE EDUCATION/TRAINING PROGRAM

## 2025-01-27 PROCEDURE — 1159F MED LIST DOCD IN RCRD: CPT | Performed by: STUDENT IN AN ORGANIZED HEALTH CARE EDUCATION/TRAINING PROGRAM

## 2025-01-27 PROCEDURE — 1036F TOBACCO NON-USER: CPT | Performed by: STUDENT IN AN ORGANIZED HEALTH CARE EDUCATION/TRAINING PROGRAM

## 2025-01-27 PROCEDURE — 72110 X-RAY EXAM L-2 SPINE 4/>VWS: CPT

## 2025-01-27 PROCEDURE — 72110 X-RAY EXAM L-2 SPINE 4/>VWS: CPT | Performed by: RADIOLOGY

## 2025-01-27 ASSESSMENT — PAIN DESCRIPTION - DESCRIPTORS: DESCRIPTORS: ACHING;SORE;DISCOMFORT

## 2025-01-27 ASSESSMENT — PATIENT HEALTH QUESTIONNAIRE - PHQ9
2. FEELING DOWN, DEPRESSED OR HOPELESS: NOT AT ALL
1. LITTLE INTEREST OR PLEASURE IN DOING THINGS: NOT AT ALL
SUM OF ALL RESPONSES TO PHQ9 QUESTIONS 1 AND 2: 0

## 2025-01-27 ASSESSMENT — PAIN - FUNCTIONAL ASSESSMENT: PAIN_FUNCTIONAL_ASSESSMENT: 0-10

## 2025-01-27 ASSESSMENT — PAIN SCALES - GENERAL: PAINLEVEL_OUTOF10: 8

## 2025-01-27 ASSESSMENT — ENCOUNTER SYMPTOMS
DEPRESSION: 0
OCCASIONAL FEELINGS OF UNSTEADINESS: 1
LOSS OF SENSATION IN FEET: 0

## 2025-01-27 NOTE — PROGRESS NOTES
"Orthopaedic Spine Surgery Clinic Note    Patient ID: Ana Bolton is a 79 y.o. female.    Chief Complaint  Chief Complaint   Patient presents with    Spine - Pain     MRI REVIEW          History of Present Illness  Ms. Bolton is a pleasant 79-year-old female who presents for initial evaluation of chronic low back pain.  Patient states that her primary concerns are with regards to the appearance of her low back and its \"crookedness\".  She describes a curvature of her back with resultant postural changes and the strain it puts on her back.  She relates activity related low back pain that worsens with prolonged ambulation.  She does obtain rest relief when she sits or lies down.  She currently denies any lower extremity radiculopathy, although she did state that previously she had right lower extremity radiculopathy.  This is now well-controlled on a regimen of pregabalin.  She denies any current numbness or paresthesias to her lower extremities.  Denies new bowel or bladder control issues or saddle anesthesia.    Of note, patient did relate an episode approximately 2 weeks ago where she pulled something and sustained a sharp pain in the right side of her low back that persisted.  It ultimately did improve over the last couple weeks.    Patient has tried physical therapy with several therapy sessions throughout July and August 2024.  She has more recently been working with Dr. Felicitas Garvey of pain management.  She had a previous L5-S1 ORION performed which provided good relief of her lower extremity radiculopathy.  Patient also currently is dealing with a nondisplaced fracture of her left distal fibula for which she is in a boot.    Patient is a retired RN.     Prior treatments:  Physical therapy, oral medications including pregabalin and NSAIDs  Pain management (Dr. Felicitas Garvey) -L5-S1 ORION in 2020 for right lower extremity radiculopathy, significant improvement      Relevant PMH/PSH for spine  CKD stage III, last Cr " 1.45 9 months ago    Past Medical History:   Diagnosis Date    Allergic contact dermatitis due to plants, except food 07/16/2019    Contact dermatitis due to poison ivy    Body mass index (BMI) 26.0-26.9, adult 05/10/2021    Body mass index (BMI) of 26.0 to 26.9 in adult    Breast mass 05/23/2023    Encounter for general adult medical examination without abnormal findings 04/08/2016    Annual physical exam    Encounter for screening, unspecified 12/16/2014    Screening procedure    Essential (primary) hypertension 03/18/2013    Benign essential hypertension    Impacted cerumen, bilateral 12/30/2020    Bilateral impacted cerumen    Low back pain, unspecified 04/14/2020    Acute low back pain    Lumbar radiculopathy 05/23/2023    Osteoarthritis of knee 05/23/2023    Other conditions influencing health status 03/17/2014    Mammogram    Other dental procedure status     Other dental procedure status    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    Post herpetic neuralgia    Personal history of other benign neoplasm 04/03/2013    History of other benign neoplasm    Personal history of other diseases of urinary system 12/16/2014    History of hematuria    Personal history of other endocrine, nutritional and metabolic disease 05/08/2020    History of estrogen deficiency    Personal history of other infectious and parasitic diseases 06/10/2020    History of herpes zoster    Personal history of other specified conditions 03/22/2017    History of motion sickness    Personal history of urinary calculi 03/22/2017    History of renal calculi    Polyosteoarthritis, 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    Pure hypercholesterolemia, unspecified 03/18/2013    Low-density-lipoid-type (LDL) hyperlipoproteinemia    Sacroiliac joint dysfunction of right side 05/23/2023    Sciatica, unspecified side 02/14/2020    Sciatic pain    Unspecified symptoms and signs involving the genitourinary system 12/04/2014    Urinary symptom or sign        Past Surgical History:   Procedure Laterality Date    KNEE SURGERY  03/18/2013    Knee Surgery       Social History     Socioeconomic History    Marital status:    Tobacco Use    Smoking status: Never    Smokeless tobacco: Never   Vaping Use    Vaping status: Never Used   Substance and Sexual Activity    Alcohol use: Not Currently    Drug use: Never     Social Drivers of Health     Financial Resource Strain: Low Risk  (11/21/2024)    Received from [facility]    Overall Financial Resource Strain (CARDIA)     Difficulty of Paying Living Expenses: Not hard at all   Food Insecurity: No Food Insecurity (11/21/2024)    Received from [facility]    Hunger Vital Sign     Worried About Running Out of Food in the Last Year: Never true     Ran Out of Food in the Last Year: Never true   Transportation Needs: No Transportation Needs (11/21/2024)    Received from [facility]    PRAPARE - Transportation     Lack of Transportation (Medical): No     Lack of Transportation (Non-Medical): No   Physical Activity: Insufficiently Active (11/21/2024)    Received from [facility]    Exercise Vital Sign     Days of Exercise per Week: 3 days     Minutes of Exercise per Session: 30 min   Stress: No Stress Concern Present (11/21/2024)    Received from [facility]    [employer] - Occupational Stress Questionnaire     Feeling of Stress : Not at all   Social Connections: Socially Integrated (11/21/2024)    Received from [facility]    Social Connection and Isolation Panel [NHANES]     Frequency of Communication with Friends and Family: Three times a week     Frequency of Social Gatherings with Friends and Family: Three times a week     Attends Sikhism Services: More than 4 times per year     Active Member of Clubs or Organizations: Yes     Attends Club or Organization Meetings: 1 to 4 times per year     Marital Status:    Intimate Partner Violence: Not At Risk (11/21/2024)    Received from  [facility]    Humiliation, Afraid, Rape, and Kick questionnaire     Fear of Current or Ex-Partner: No     Emotionally Abused: No     Physically Abused: No     Sexually Abused: No   Housing Stability: Low Risk  (11/21/2024)    Received from [facility]    Housing Stability Vital Sign     Unable to Pay for Housing in the Last Year: No     Number of Times Moved in the Last Year: 0     Homeless in the Last Year: No       Family History   Problem Relation Name Age of Onset    Colon cancer Mother      Heart attack Father      Breast cancer Sister      Other (cabg) Brother      Heart disease Brother         Allergies   Allergen Reactions    Sulfa (Sulfonamide Antibiotics) Other       Current Outpatient Medications   Medication Instructions    atenolol (TENORMIN) 100 mg, oral, Daily    calcium carbonate 600 mg calcium (1,500 mg) tablet 1 tablet, Daily    cholecalciferol (Vitamin D-3) 25 MCG (1000 UT) capsule Take by mouth.    diclofenac sodium 100 g    hydroCHLOROthiazide (HYDRODIURIL) 25 mg, oral, Daily    lisinopril 40 mg, oral, Daily    pregabalin (LYRICA) 100 mg, oral, 2 times daily    pregabalin (LYRICA) 50 mg, oral, 2 times daily    simvastatin (ZOCOR) 40 mg, oral, Daily         Vitals & Measurements  There were no vitals filed for this visit.     Ortho Exam  General: AO x 3, NAD  Cardio: examined extremities perfused by peripheral palpation  Resp: breathing unlabored  Gait: within normal limits, non-antalgic  On upright coronal view, patient with trunk shift to the left with left-sided coronal imbalance of approximately 4 cm  On sagittal view, patient overall appears balanced with head centered over the pelvis.  Slight over exaggeration of thoracic kyphosis    Lower Extremity  Right  Left  IP   5/5  5/5  Quadriceps  5/5  5/5  Tibialis anterior  5/5  5/5  EHL   5/5  5/5  Gastrocnemius  5/5  5/5  Left foot examination limited due to boot, however exam grades above are approximate given limitations    Sensation: Normal  to light touch throughout lower extremities bilaterally.    Reflexes:  Right   Left  Q  1+  1+  A   2+   unable to obtain    Clonus: negative, unable to obtain on left        Diagnostic Results - Imaging    MRI lumbar spine, 1/20/25  FINDINGS:  For counting purposes the last lumbarized vertebral body is labeled  L5.      There is dextroconvexity of the lumbar spine centered at L1-L2.      Alignment and vertebral body heights are maintained. There is  increased STIR signal within the L2-L3 endplates on the right which  likely represent Modic type 1 degenerative endplate changes. Bone  marrow signal pattern is otherwise within normal limits.      There is desiccated disc signal throughout the lower thoracic and  lumbar spine with loss of disc height along the inner convexity.      The conus terminates at L1 and is unremarkable.      Prevertebral soft tissues are not thickened. Incompletely evaluated  exophytic T2 hyperintense lesion of the left kidney likely represents  a cyst.      Evaluation by level:      T11-T12: No spinal canal or neural foraminal stenosis      T12-L1: Mild left eccentric disc bulge and facet arthrosis. No spinal  canal stenosis. No right and mild left neural foraminal stenosis.      L1-L2: Disc bulge and facet arthrosis. No spinal canal stenosis. No  right and mild left neural foraminal stenosis.      L2-L3: Disc bulge, facet arthrosis and ligamentum flavum thickening.  Mild spinal canal stenosis, narrowing of the subarticular recess,  moderate left and no right neural foraminal stenosis.      L3-L4: Disc bulge, facet arthrosis and ligamentum flavum thickening.  Mild spinal canal stenosis, mild narrowing of the bilateral  subarticular recess, mild left and no right neural foraminal stenosis.      L4-L5: Disc bulge, facet arthrosis and ligamentum flavum thickening.  No spinal canal stenosis, narrowing of the right subarticular recess,  moderate right and no left neural foraminal stenosis.       L5-S1: Disc bulge with a small central disc protrusion and bilateral  facet arthrosis. No spinal canal stenosis. Moderate to severe right  and mild left neural foraminal stenosis.      IMPRESSION:  Dextroconvexity of the lumbar spine with multilevel degenerative disc  disease and facet arthrosis without high-grade spinal canal stenosis.  Neural foraminal narrowing most pronounced at L5-S1 with moderate to  severe right and mild left neural foraminal stenosis. No significant  interval change compared to the prior exam 06/09/2020.        XR lumbar spine today, 1/27/25  Official read pending.  New upright AP, lateral, flexion, and extension radiographs of the lumbar spine were obtained in the office today.  These images are of good quality.  Demonstrated on coronal view is a degenerative scoliosis apex right measuring approximately 33 degrees.  Patient with what appears to be a trunk shift to the left.  On sagittal profile there is a hypolordotic lumbar spine.  There is posterior pelvic tilt.  Pelvic incidence measures approximately 44 degrees and the lumbar lordosis measures approximately 16 degrees.      XR DEXA scan, 8/12/20  FINDINGS: Standard measurements were obtained utilizing a [employer]   X-ray Absorptiometry bone densitometer. Data obtained includes planar   bone density measurements over the DualFemur and Lumbar Spine.   Comparison of measured data and standardized mean data for a young   adult population (when peak bone mass occurs) results in a T score.   This represents the number of standard deviations above or below the   mean of a young adult population. Comparison of measured data to   standards from an age adjusted population similarly yields a Z score.          DualFemur Neck Mean :     . Bone Density: 0.998 g/cm2 . . T Score: -0.3 . . Z Score: 1.4 . .   WHO Classification: Normal . . % Change: -8.4%      DualFemur Total Mean :     . Bone Density: 1.121 g/cm2 . . T Score: 0.9 . . Z Score: 2.4 .  .   WHO Classification: Normal . . % Change: -4.8% *      AP Spine L1-L4 :    . Bone Density: 1.695 g/cm2 . . T Score: 4.3 . . Z Score: 5.7 . .   WHO Classification: Normal . . % Change: 33.7% *   .   World Health Organization (WHO) criteria defines normal bone density   as that which is less than 1 standard deviation (S.D.) below the mean   of a young adult population. Osteopenia is defined as a measured bone   density that is between 1 and 2.5 S.D. below the mean of a young   adult population. Osteoporosis is defined as a measured bone density   that is greater than or equal to 2.5 S.D. below the mean of a young   adult population. The WHO reference diagnosis is based on   postmenopausal women and men age 50 and over.   .   IMPRESSION:   The BMD measured at AP Spine L1-L4 is 1.695 g/cm2 with a T-score of   4.3.  This patient is considered normal according to World Health   Organization (WHO) criteria.  Fracture risk is low.      The BMD measured at Femur Neck Left is 1.010 g/cm2 with a T-score of   -0.2.  Bone density is up to 10% below young normal.  This patient is   considered normal according to World Health Organization (WHO)   criteria.  Fracture risk is low.      The BMD measured at Femur Neck Right is 0.985 g/cm2 with a T-score of   -0.4.  Bone density is up to 10% below young normal.  This patient is   considered normal according to World Health Organization (WHO)   criteria.  Fracture risk is low.      The BMD measured at Femur Total Left is 1.144 g/cm2 with a T-score of   1.1.  This patient is considered normal according to World Health   Organization (WHO) criteria.  Fracture risk is low.      The BMD measured at Femur Total Right is 1.098 g/cm2 with a T-score   of 0.7.  This patient is considered normal according to World Health   Organization (WHO) criteria.  Fracture risk is low.   .   Bone mineral density in the lumbar spine may be falsely elevated   secondary to discogenic degenerative disease and  degenerative facet   sclerosis.      Follow-up DEXA and treatment is recommended as clinically indicated.   .   All images and detailed analysis are available on the  Radiology   PACS.*      *For patients with comparison exams obtained from [facility] DEXA prior   to December 2006, measurements presented in this report have been   modified to reflect more accurate trend analysis when compared to   current data obtained on the [facility] DEXA.      Diagnosis  Encounter Diagnoses   Name Primary?    Chronic bilateral low back pain without sciatica     Degenerative scoliosis in adult patient Yes          Assessment/Plan  Ms. Bolton is a pleasant 79-year-old female presents for initial evaluation of chronic low back pain.  Patient reports activity related low back pain that worsens with prolonged ambulation.  She does obtain reliable rest relief.  She denies any lower extremity sciatica, although this has been an issue for her in the past.  She has previously worked with physical therapy as well as pain management (Dr. Felicitas Garvey).  She did have an L5-S1 ORION performed for sciatica in the past which seems to have provided significant relief.  Her current symptoms are now her activity related low back pain as well as the postural imbalance of her degenerative scoliosis.  She is also bothered by the cosmetic appearance of this.  She denies any lower extremity sciatica currently.    We did have an extensive discussion in the office today with regards to her symptoms, her imaging findings, and possible management options.  We discussed the nature of her degenerative scoliosis.  On examination, patient does exhibit a trunk shift to the left with coronal imbalance of approximately 4 cm, however we do not have a full-length scoliosis XR.  Patient MRI does demonstrate foraminal stenosis primarily on the right at L5-S1, which was likely her prior focus of sciatica.  This is currently not bothersome for her.    We discussed the  nature of degenerative scoliosis and how this is likely secondary to age-related degeneration in addition to bone density.  Patient did have a bone density scan performed in 2020 which was good with a T-score of 4.3.  However her XR evaluation today in the office does demonstrate some element of osteopenia.  Patient has been working with her PCP Ms. Melendez who has ordered a new bone density scan.  I encouraged moving forward with this.     We discussed the nature of corrective surgery for her degenerative scoliosis which would likely entail a long segment thoracolumbar instrumented fusion with multilevel osteotomy and deformity correction.  We discussed the risks, benefits, expected outcomes, and personnel involved with such an undertaking.  We discussed how the goals of the surgery would be to restore her coronal balance and to assist to some extent with her activity related low back pain, although no guarantees were offered.  We also discussed that some element of low back pain may certainly remain after a long segment fusion.  We discussed how the the surgery would require significant perioperative medical optimization, including evaluation of her bone density as she currently has planned in addition to optimization of her medical status.  She does have elevated creatinine levels which indicates some level of chronic kidney disease.  Patient has not had formal nephrology evaluation, so she would need to undergo this.  We also discussed the gravity of such a surgical undertaking.  This would require full exhaustion of all nonsurgical options before proceeding with something like this.  We would also need to discuss with the patient's family to make sure everyone is on board.    In the meantime, I recommended patient follow-up with Dr. Garvey to see if she would be a candidate for facet blocks/MBB and RFA procedures to help with her activity related low back pain from her facet arthropathy.  I also had our DME  representative in the office today reach out to Ms. Bolton to arrange for a lumbar corset to see if this will be helpful for her.  We will also order an EOS full-length scoliosis XR evaluation so we may fully evaluate the extent of her curvature.  Patient will continue with her bone density and kidney function evaluation and management with her PCP.  Patient will follow-up with me in approximately 3 months for repeat clinical evaluation and discussion.  I did encourage the patient bring her family to this appointment so that we may discuss in further detail. After our discussion, the patient articulated understanding of the plan and felt that all questions had been answered satisfactorily. The patient was pleased with the visit and very appreciative for the care rendered.    **Please excuse any errors in grammar or translation related to this dictation. Voice recognition software was utilized to prepare this document. **    F/u 3 months.       Orders Placed This Encounter    XR lumbar spine complete 4+ views    XR EOS full spine 2 view scolosis       --    Vadim Matta MD  Orthopaedic Spine Surgery  , Department of Orthopaedic Surgery  [facility]

## 2025-02-04 ENCOUNTER — APPOINTMENT (OUTPATIENT)
Dept: RADIOLOGY | Facility: CLINIC | Age: 80
End: 2025-02-04
Payer: MEDICARE

## 2025-02-04 ENCOUNTER — HOSPITAL ENCOUNTER (OUTPATIENT)
Dept: RADIOLOGY | Facility: CLINIC | Age: 80
Discharge: HOME | End: 2025-02-04
Payer: MEDICARE

## 2025-02-04 ENCOUNTER — APPOINTMENT (OUTPATIENT)
Dept: PRIMARY CARE | Facility: CLINIC | Age: 80
End: 2025-02-04
Payer: MEDICARE

## 2025-02-04 PROCEDURE — 77080 DXA BONE DENSITY AXIAL: CPT

## 2025-02-05 ENCOUNTER — HOSPITAL ENCOUNTER (OUTPATIENT)
Dept: RADIOLOGY | Facility: CLINIC | Age: 80
Discharge: HOME | End: 2025-02-05
Payer: MEDICARE

## 2025-02-05 DIAGNOSIS — M25.572 ACUTE LEFT ANKLE PAIN: ICD-10-CM
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
## 2025-02-05 DIAGNOSIS — S93.402A SPRAIN OF LEFT ANKLE, INITIAL ENCOUNTER: ICD-10-CM

## 2025-02-05 DIAGNOSIS — S93.432A SYNDESMOTIC DISRUPTION OF ANKLE, LEFT, INITIAL ENCOUNTER: ICD-10-CM

## 2025-02-05 DIAGNOSIS — S93.492A HIGH ANKLE SPRAIN, LEFT, INITIAL ENCOUNTER: ICD-10-CM

## 2025-02-05 DIAGNOSIS — S82.832A NONDISPLACED FRACTURE OF DISTAL END OF LEFT FIBULA: ICD-10-CM

## 2025-02-05 PROCEDURE — 73721 MRI JNT OF LWR EXTRE W/O DYE: CPT | Mod: LEFT SIDE | Performed by: RADIOLOGY

## 2025-02-05 PROCEDURE — 73721 MRI JNT OF LWR EXTRE W/O DYE: CPT | Mod: LT

## 2025-02-05 ASSESSMENT — ENCOUNTER SYMPTOMS
CONSTITUTIONAL NEGATIVE: 1
MYALGIAS: 1
CARDIOVASCULAR NEGATIVE: 1
ARTHRALGIAS: 1
RESPIRATORY NEGATIVE: 1

## 2025-02-05 NOTE — PROGRESS NOTES
Established patient  History Of Present Illness  Ana Bolton is a 79 y.o. female presenting with LEFT ankle pain. Presents wearing tall walking boot as previously fitted for. Since last visit in office, patient states that she feels that her ankle is better. Notes that her pain in her back might be worse from the boot. Though does note that the boot does rub on her leg but is better when she loosens the boot. Rates current pain as a 1/10. Pt is scheduled for her initial evaluation with physical therapy on 2/13.  We reviewed patient MRI results in detail.  Patient MRI shows continued healing of her previously established fracture as well as a tear of her AITFL.  After discussion we will continue with current treatment of immobilization in her walking boot and physical therapy as prescribed.  We can reevaluate at a future appointment and adjust treatment as indicated.  We can reevaluate after patient's ankle fracture is healed and adjust treatment as indicated with consideration of bracing versus referral to orthopedic surgery for any continued complaints of pain or instability.  Patient verbalizes understanding and agreement with plan of care.    Past Medical History  She has a past medical history of Allergic contact dermatitis due to plants, except food (07/16/2019), Body mass index (BMI) 26.0-26.9, adult (05/10/2021), Breast mass (05/23/2023), Encounter for general adult medical examination without abnormal findings (04/08/2016), Encounter for screening, unspecified (12/16/2014), Essential (primary) hypertension (03/18/2013), Impacted cerumen, bilateral (12/30/2020), Low back pain, unspecified (04/14/2020), Lumbar radiculopathy (05/23/2023), Osteoarthritis of knee (05/23/2023), Other conditions influencing health status (03/17/2014), Other dental procedure status, 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), Personal history of other benign neoplasm (04/03/2013), Personal history of other  "diseases of urinary system (12/16/2014), Personal history of other endocrine, nutritional and metabolic disease (05/08/2020), Personal history of other infectious and parasitic diseases (06/10/2020), Personal history of other specified conditions (03/22/2017), Personal history of urinary calculi (03/22/2017), Polyosteoarthritis, unspecified (05/23/2023), Pure hypercholesterolemia, unspecified (03/18/2013), 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    Surgical History  She has a past surgical history that includes Knee surgery (03/18/2013).     Social History  She reports that she has never smoked. She has never used smokeless tobacco. She reports that she does not currently use alcohol. She reports that she does not use drugs.    Family History  Family History   Problem Relation Name Age of Onset    Colon cancer Mother      Heart attack Father      Breast cancer Sister      Other (cabg) Brother      Heart disease Brother       Allergies  Sulfa (sulfonamide antibiotics)    Review of Systems  Review of Systems   Constitutional: Negative.    Respiratory: Negative.     Cardiovascular: Negative.    Musculoskeletal:  Positive for arthralgias and myalgias.   All other systems reviewed and are negative.    Last Recorded Vitals  /76 (BP Location: Right arm, Patient Position: Sitting, BP Cuff Size: Adult)   Pulse 62   Ht 1.651 m (5' 5\")   Wt 70.8 kg (156 lb)   BMI 25.96 kg/m²      The , MASOOD CODY was present during today's visit and not limited to physical examination!    Examination:  Left Ankle and/or Foot  Edema: Positive.   Ecchymosis/Bruising: Negative.   Percussion Test: Positive           Tuning Fork Test: Positive     Orientation:   Positive  Asymmetrical,because of the swelling.          ROM:   Positive, Decreased plantarflexion and dorsiflexion due to " pain            Muscle Strength: Positive   +3/+5 Planar Flexion, Decreased due to pain  +3/+5  Dorsi Flexion, Decreased due to pain     +3/+5 Inversion  +3/+5 Eversion        +3/+5 Plantarflexion in combination with inversion  +3/+5 Plantarflexion in combination with eversion          +3/+5 Dorsiflexion in combination with inversion (Posterior Tibialis)  +3/+5 Dorsiflexion in combination with eversion (Peroneal Brevis)  +3/+5 Dorsiflexion in combination with eversion and flexion of great toe (Peroneal Longus).            Palpation:   Positive, Painful and Tender to Palpation Left Medial Cuneiform AND dorsal aspect of foot           Vascular:   +2/+4 Dorsalis Pedis  +2/+4 Posterior Tibialis  Capillary Refill < 2 Seconds.        Leg/Ankle/Foot - Ankle Impingement:  Posterior Ankle Impingement Test: Negative.   Anterior Ankle Impingement Test: Negative.         Leg/Ankle/Foot - Ankle Instability:  Flexibility Test:  Positive.   Anterior Drawer Test:  Negative.   Talar Tilt Test:  Negative.   External Rotation Kleiger Plantar Flexion Test: Positive  External Rotation Kleiger Dorsiflexion Test:  Positive  Squeeze Test:  Positive  Dorsiflexion Test:  Negative.   Posterior Tibial Instability Test: Negative.  Peroneal Tendon Instability Test:  Negative.  Horizontal Squeeze Test:  Positive.   Vertical Squeeze Test:  Positive.   Standing/Walking Test:  Positive, Painful.         Leg/Ankle/Foot - DVT:  Ev's Sign: Negative.         Leg/Ankle/Foot - Forefoot:  Strunsky Test:  Negative.   Gaenslen Maneuver Test:  Negative.   Metatarsal Tap Test: Negative.     Crepitation Test:  Negative.         Leg/Ankle/Foot - Hindfoot Achilles/Calcaneus:  Orourke Compression Test: Negative.   Hoffa Sign: Negative.   Achilles Tendon Tap Test: Negative.   Heel Compression Test: Negative.         Leg/Ankle/Foot - Nerve Irritation:  Andrea Sign: Negative.   Digital Nerve Stretch Test: Negative.   Tinel Sign: Negative.   Tourniquet Sign:  Negative.         Feet/Foot:   Positive BILATERAL  Valgus foot     Imaging and Diagnostics Review:  No new x-rays ordered at today's visit    Assessment   1. Displaced fracture of lateral malleolus of left fibula, subsequent encounter for closed fracture with routine healing    2. Tear of deltoid ligament of ankle, left, subsequent encounter    3. Sprain of anterior talofibular ligament, left, sequela    4. Ankle joint effusion, left    5. Peroneal tenosynovitis    6. Achilles tendinosis of left ankle    7. Acute left ankle pain    8. Fall, subsequent encounter          Plan   Treatment or Intervention:  May use PRICE therapy as needed.  Start into Physical Therapy 1-2 times a week for 8-10 weeks with manual therapy as well as dry needling and IASTM  Again stressed the importance of wearing shoes with good stability control to help with the biomechanics affecting the lower legs  Again stressed the importance of wearing full foot insoles to help with the biomechanics affecting the lower legs  Recommendation over-the-counter calcium 500mg, 3 times a day with vitamin-D3 6451-1869+ units a day with food as well as a daily multivitamin  Recommendation over-the-counter Move Free for joint health  May take OTC Tylenol Extra Strength or OTC Tylenol Arthritis, taking one every 6-8 hours with food as needed for pain management.  Patient advised regarding the risk and/or potential adverse reactions and/or side effects of any prescribed medications along with any over-the-counter medications or any supplements used. Patient advised to seek immediate medical care if any adverse reactions occur. The patient and/or patient(s) parent(s) verbalized their understanding.  Discussed in detail with the patient to the level of their understanding the possibility in the future of regenerative injections versus corticosteroids injections,   Reviewed LEFT ANKLE MRI in detail with the patient and/or patients parent/legal guardian to their  level of understanding; a copy of these results were provided to the patient and/or patients parent/legal guardian at the time of this office visit.   Patient to continue TALL WALKING BOOT brace for their LEFT ANKLE injury/condition.     Diagnostic studies:  Interpreted By:  Deon Fitzgerald,   STUDY: XR ANKLE LEFT 3+ VIEWS      INDICATION: Signs/Symptoms:post fall, swelling/pain in ankle.      COMPARISON: None      ACCESSION NUMBER(S): [id number]      ORDERING CLINICIAN: MASOOD ARDON      FINDINGS:  Nondisplaced oblique fracture left distal fibula at the metaphysis.      Slightly widened medial mortise could suggest a component of  ligamentous injury.      Midfoot arthrosis and some calcaneal spurs as well as prior ankle  sprains with posttraumatic heterotopic ossification.      IMPRESSION:   Nondisplaced oblique fracture left distal fibula at the metaphysis.      Slightly widened medial mortise could suggest a component of  ligamentous injury.      Signed by: Deon Fitzgerald 1/18/2025 6:10 PM  Dictation workstation:   [id number]    Activity Instructions, Restrictions, and Accommodations:      Consultations/Referrals:  Physical therapy    Follow-up:  Follow up 3-4 weeks  Total appointment time _30_ minutes. Greater than 50% spent counseling patient on results of physical exam, treatment options as well as results of ordered imaging and treatment for results, need for PT and expected outcomes, as well as discussing possible medications.    Jayy Andrews CNP

## 2025-02-05 NOTE — PATIENT INSTRUCTIONS
May use PRICE therapy as needed.  Start into Physical Therapy 1-2 times a week for 8-10 weeks with manual therapy as well as dry needling and IASTM  Again stressed the importance of wearing shoes with good stability control to help with the biomechanics affecting the lower legs  Again stressed the importance of wearing full foot insoles to help with the biomechanics affecting the lower legs  Recommendation over-the-counter calcium 500mg, 3 times a day with vitamin-D3 2778-1077+ units a day with food as well as a daily multivitamin  Recommendation over-the-counter Move Free for joint health  May take OTC Tylenol Extra Strength or OTC Tylenol Arthritis, taking one every 6-8 hours with food as needed for pain management.  Patient advised regarding the risk and/or potential adverse reactions and/or side effects of any prescribed medications along with any over-the-counter medications or any supplements used. Patient advised to seek immediate medical care if any adverse reactions occur. The patient and/or patient(s) parent(s) verbalized their understanding.  Discussed in detail with the patient to the level of their understanding the possibility in the future of regenerative injections versus corticosteroids injections,   Reviewed LEFT ANKLE MRI in detail with the patient and/or patients parent/legal guardian to their level of understanding; a copy of these results were provided to the patient and/or patients parent/legal guardian at the time of this office visit.   Patient to continue TALL WALKING BOOT brace for their LEFT ANKLE injury/condition.   Follow up 3-4 weeks rasheed

## 2025-02-10 ENCOUNTER — OFFICE VISIT (OUTPATIENT)
Dept: SPORTS MEDICINE | Facility: CLINIC | Age: 80
End: 2025-02-10
Payer: MEDICARE

## 2025-02-10 VITALS
HEIGHT: 65 IN | BODY MASS INDEX: 25.99 KG/M2 | WEIGHT: 156 LBS | HEART RATE: 62 BPM | DIASTOLIC BLOOD PRESSURE: 76 MMHG | SYSTOLIC BLOOD PRESSURE: 122 MMHG
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
## 2025-02-10 DIAGNOSIS — M67.874 ACHILLES TENDINOSIS OF LEFT ANKLE: ICD-10-CM

## 2025-02-10 DIAGNOSIS — M25.572 ACUTE LEFT ANKLE PAIN: ICD-10-CM

## 2025-02-10 DIAGNOSIS — M25.472 ANKLE JOINT EFFUSION, LEFT: ICD-10-CM

## 2025-02-10 DIAGNOSIS — S93.492S SPRAIN OF ANTERIOR TALOFIBULAR LIGAMENT, LEFT, SEQUELA: ICD-10-CM

## 2025-02-10 DIAGNOSIS — M65.979 PERONEAL TENOSYNOVITIS: ICD-10-CM

## 2025-02-10 DIAGNOSIS — S93.422D TEAR OF DELTOID LIGAMENT OF ANKLE, LEFT, SUBSEQUENT ENCOUNTER: ICD-10-CM

## 2025-02-10 DIAGNOSIS — S82.62XD DISPLACED FRACTURE OF LATERAL MALLEOLUS OF LEFT FIBULA, SUBSEQUENT ENCOUNTER FOR CLOSED FRACTURE WITH ROUTINE HEALING: ICD-10-CM

## 2025-02-10 PROCEDURE — 3078F DIAST BP <80 MM HG: CPT | Performed by: NURSE PRACTITIONER

## 2025-02-10 PROCEDURE — 1036F TOBACCO NON-USER: CPT | Performed by: NURSE PRACTITIONER

## 2025-02-10 PROCEDURE — 99214 OFFICE O/P EST MOD 30 MIN: CPT | Performed by: NURSE PRACTITIONER

## 2025-02-10 PROCEDURE — 3074F SYST BP LT 130 MM HG: CPT | Performed by: NURSE PRACTITIONER

## 2025-02-10 PROCEDURE — 1125F AMNT PAIN NOTED PAIN PRSNT: CPT | Performed by: NURSE PRACTITIONER

## 2025-02-10 PROCEDURE — 1159F MED LIST DOCD IN RCRD: CPT | Performed by: NURSE PRACTITIONER

## 2025-02-10 ASSESSMENT — ENCOUNTER SYMPTOMS
DEPRESSION: 0
OCCASIONAL FEELINGS OF UNSTEADINESS: 1
LOSS OF SENSATION IN FEET: 0

## 2025-02-10 ASSESSMENT — PAIN SCALES - GENERAL: PAINLEVEL_OUTOF10: 1

## 2025-02-10 NOTE — PROGRESS NOTES
Physical Therapy Evaluation    Patient Name: Ana Bolton  MRN: [id number]  Evaluation Date: 2/13/2025  Time Calculation  Start Time: 1245  Stop Time: 1330  Time Calculation (min): 45 min  PT Evaluation Time Entry  PT Evaluation (Moderate) Time Entry: 25           Insurance Information: EVAL ONLY, AUTH REQUIRED, 35.00 CO PAY, 100% COVERAGE, [employer]   Problem List Items Addressed This Visit             ICD-10-CM    Acute left ankle pain - Primary M25.572    Fall W19.XXXA    Nondisplaced fracture of distal end of left fibula S82.832A    High ankle sprain, left, initial encounter S93.492A    Sprain of left ankle, initial encounter S93.402A    Syndesmotic disruption of ankle, left, initial encounter S93.432A         Subjective   General: Patient is a 80 yo female with diagnosis of left ankle fracture.  Patient reports twisting ankle and falling mid December.  Patient has been in a walking boot since 1/23. Patient MRI revealed tear of ATFL.  Patient ankle has improved; however, LBP has increased secondary to LLD form boot.  Patient motivated to participate.           Precautions:  Lumbar stenosis with radiculopathy  Healing fracture- immobilized since 1/23- no ankle ROM until cleared by sports medicine         Relevant PMH:  LBP  HTN  Lumbar stenosis with radiculopathy  CKD 3  OA  Bilateral TKA        Pain:  Left ankle  At worst  3/10  Worse with walking    0/10  Better with   Quiet sitting     Home Living:  Home type: House  Stairs: Yes  Lives with: Spouse  Occupation: retired  Hobbies/activities: walking    Prior Function Per Pt/Caregiver Report:  Active female       Imaging:  MRI left ankle  IMPRESSION:  1. Oblique mildly displaced fracture through the lateral malleolus  with periosteal reaction and healing fracture noted with reactive  marrow edema demonstrated.      2. Anterior inferior tibiofibular ligament is torn. There is moderate  grade sprain of the distal interosseous tibiofibular ligament and  posterior  inferior tibiofibular ligaments. No significant widening of  the ankle mortise.      3. Moderate ankle joint effusion.      4. Mild peroneal tenosynovitis.  X-ray left ankle    FINDINGS:   Nondisplaced oblique fracture left distal fibula at the metaphysis.       Slightly widened medial mortise could suggest a component of   ligamentous injury.       Midfoot arthrosis and some calcaneal spurs as well as prior ankle   sprains with posttraumatic heterotopic ossification.         MRI L-spine  L2-L3: Disc bulge, facet arthrosis and ligamentum flavum thickening.   Mild spinal canal stenosis, narrowing of the subarticular recess,   moderate left and no right neural foraminal stenosis.     L3-L4: Disc bulge, facet arthrosis and ligamentum flavum thickening.   Mild spinal canal stenosis, mild narrowing of the bilateral   subarticular recess, mild left and no right neural foraminal stenosis.     L4-L5: Disc bulge, facet arthrosis and ligamentum flavum thickening.   No spinal canal stenosis, narrowing of the right subarticular recess,   moderate right and no left neural foraminal stenosis.     L5-S1: Disc bulge with a small central disc protrusion and bilateral   facet arthrosis. No spinal canal stenosis. Moderate to severe right   and mild left neural foraminal stenosis.         Objective   Posture:  FHP     Range of Motion:  Ankle AROM L R   Dorsiflexion NE deg WNL deg   Plantarflexion NE deg WNL deg   Eversion NE deg WNL deg   Inversion NE deg WNL deg         Strength:  Ankle MMT L R   Dorsiflexion NE/5 4/5   Plantarflexion NE/5 4/5   Eversion NE/5 4-/5   Inversion NE/5 4/5         Flexibility:     Palpation:  No gross tenderness to palpate      Circumferential measurement (joint line)  Left 26 cm  Modest swelling left ankle         Gait:  Boot in place left  Using straight cane/left in right shoe       Transfers:  WNL      Outcome Measures:  LEFS  31/80    Assessment  Pt is a 79 y.o. female who presents with impairments of  left ankle pain/tightness/weakness. These impairments have led to functional limitations including di. Pt would benefit from skilled physical therapy intervention to improve above impairments and facilitate return to function.    Complexity of Evaluation: Moderate    Based on the history including personal factors and/or comorbidities, examination of body systems including body structures and function, activity limitations, and/or participation restrictions, as well as clinical presentation, patient meets criteria for above complexity evaluation.    Plan  1-2x/week  Up to 10 visits  Pending authorization  Therapeutic exercise  Gait training  Neuromuscular re-education  Manual  aquatics       Insurance Plan: Payor: [employer] MEDICARE / Plan: [employer] MEDICARE ADVANTAGE / Product Type: *No Product type* /     Plan for next visit:   Advance HEP/therapeutic exercise- focus on complimentary strenghtening  Add manual as appropriate      OP EDUCATION:  HEP       Today's Treatment:  No treatment billed today as pt requires prior authorization  HEP to be completed daily, exercises include:  LAQ  Iso hip ADD  Mint TB hip ABD    Goals:  STG (3 visits)  Patient to be independent with HEP     LTG (10 visits)- pending auth   LEFS to <20% impaired   Patient to perform ADLs with pain < 0/10   Patient to ambulate community distances with least restrictive device   AROM left ankle = right   MMT left ankle = right

## 2025-02-13 ENCOUNTER — EVALUATION (OUTPATIENT)
Dept: PHYSICAL THERAPY | Facility: CLINIC | Age: 80
End: 2025-02-13
Payer: MEDICARE
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
## 2025-02-13 DIAGNOSIS — S82.832A NONDISPLACED FRACTURE OF DISTAL END OF LEFT FIBULA: ICD-10-CM

## 2025-02-13 DIAGNOSIS — M25.572 ACUTE LEFT ANKLE PAIN: Primary | ICD-10-CM

## 2025-02-13 DIAGNOSIS — S93.432A SYNDESMOTIC DISRUPTION OF ANKLE, LEFT, INITIAL ENCOUNTER: ICD-10-CM

## 2025-02-13 DIAGNOSIS — S93.402A SPRAIN OF LEFT ANKLE, INITIAL ENCOUNTER: ICD-10-CM

## 2025-02-13 DIAGNOSIS — S93.492A HIGH ANKLE SPRAIN, LEFT, INITIAL ENCOUNTER: ICD-10-CM

## 2025-02-13 PROCEDURE — 97162 PT EVAL MOD COMPLEX 30 MIN: CPT | Mod: GP

## 2025-03-05 ASSESSMENT — ENCOUNTER SYMPTOMS
RESPIRATORY NEGATIVE: 1
CONSTITUTIONAL NEGATIVE: 1
MYALGIAS: 1
CARDIOVASCULAR NEGATIVE: 1
ARTHRALGIAS: 1

## 2025-03-05 NOTE — PATIENT INSTRUCTIONS
May use PRICE therapy as needed.  Start and continue Physical Therapy 1-2 times a week for 8-10 weeks with manual therapy as well as dry needling and IASTM  Again stressed the importance of wearing shoes with good stability control to help with the biomechanics affecting the lower legs  Again stressed the importance of wearing full foot insoles to help with the biomechanics affecting the lower legs  Recommendation over-the-counter calcium 500mg, 3 times a day with vitamin-D3 1595-5794+ units a day with food as well as a daily multivitamin  Recommendation over-the-counter Move Free for joint health  May take OTC Tylenol Extra Strength or OTC Tylenol Arthritis, taking one every 6-8 hours with food as needed for pain management.  Patient advised regarding the risk and/or potential adverse reactions and/or side effects of any prescribed medications along with any over-the-counter medications or any supplements used. Patient advised to seek immediate medical care if any adverse reactions occur. The patient and/or patient(s) parent(s) verbalized their understanding.  Discussed in detail with the patient to the level of their understanding the possibility in the future of regenerative injections versus corticosteroids injections,   Patient to continue TALL WALKING BOOT brace for their LEFT ANKLE injury/condition. May take boot off for physical therapy  Follow up 4 weeks rasheed

## 2025-03-05 NOTE — PROGRESS NOTES
Established patient  History Of Present Illness  Ana Bolton is a 79 y.o. female presenting with LEFT ankle pain. Presents wearing tall walking boot as previously fitted for. Since last visit in office, patient states that she feels improved. Rates current pain as a 0.5/10 noting that the boot if anything is irritating her foot. Pt has completed her initial evaluation of physical therapy since last visit in office.  We reviewed patient x-ray results in detail.  Patient x-rays show continuation of previously established fracture however patient does show healing with some bony bridging and patient has decreased pain on exam.  Patient is also able to ambulate and bear weight on the affected extremity with no pain.  As such after discussion we will continue with current course of treatment of immobilization in her tall walking boot but patient can remove boot to do physical therapy and to do her home exercises as prescribed by PT.  Patient will continue with calcium and vitamin D that she is been taking as recommended and can follow-up in 4 weeks for kavin-ray and reevaluation.  Patient verbalizes understanding and agreement with plan of care.    Past Medical History  She has a past medical history of Allergic contact dermatitis due to plants, except food (07/16/2019), Body mass index (BMI) 26.0-26.9, adult (05/10/2021), Breast mass (05/23/2023), Encounter for general adult medical examination without abnormal findings (04/08/2016), Encounter for screening, unspecified (12/16/2014), Essential (primary) hypertension (03/18/2013), Impacted cerumen, bilateral (12/30/2020), Low back pain, unspecified (04/14/2020), Lumbar radiculopathy (05/23/2023), Osteoarthritis of knee (05/23/2023), Other conditions influencing health status (03/17/2014), Other dental procedure status, 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), Personal history of other benign neoplasm (04/03/2013), Personal history of other diseases of  "urinary system (12/16/2014), Personal history of other endocrine, nutritional and metabolic disease (05/08/2020), Personal history of other infectious and parasitic diseases (06/10/2020), Personal history of other specified conditions (03/22/2017), Personal history of urinary calculi (03/22/2017), Polyosteoarthritis, unspecified (05/23/2023), Pure hypercholesterolemia, unspecified (03/18/2013), 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    Surgical History  She has a past surgical history that includes Knee surgery (03/18/2013).     Social History  She reports that she has never smoked. She has never used smokeless tobacco. She reports that she does not currently use alcohol. She reports that she does not use drugs.    Family History  Family History   Problem Relation Name Age of Onset    Colon cancer Mother      Heart attack Father      Breast cancer Sister      Other (cabg) Brother      Heart disease Brother       Allergies  Sulfa (sulfonamide antibiotics)    Review of Systems  Review of Systems   Constitutional: Negative.    Respiratory: Negative.     Cardiovascular: Negative.    Musculoskeletal:  Positive for arthralgias and myalgias.   All other systems reviewed and are negative.    Last Recorded Vitals  /76 (BP Location: Right arm, Patient Position: Sitting, BP Cuff Size: Adult)   Pulse 63   Ht 1.651 m (5' 5\")   Wt 70.8 kg (156 lb)   BMI 25.96 kg/m²      The , MASOOD CODY was present during today's visit and not limited to physical examination!    Examination:  Left Ankle and/or Foot  Edema: Negative.   Ecchymosis/Bruising: Negative.   Percussion Test: Negative.      Tuning Fork Test: Negative.     Orientation:   Negative.          ROM:   Positive, Decreased plantarflexion and dorsiflexion due to pain            Muscle Strength: Positive   +5/+5 Planar " Flexion,   +5/+5  Dorsi Flexion,     +4/+5 Inversion  +4/+5 Eversion        +4/+5 Plantarflexion in combination with inversion  +4/+5 Plantarflexion in combination with eversion          +4/+5 Dorsiflexion in combination with inversion (Posterior Tibialis)  +4/+5 Dorsiflexion in combination with eversion (Peroneal Brevis)  +4/+5 Dorsiflexion in combination with eversion and flexion of great toe (Peroneal Longus).            Palpation:   Negative.            Vascular:   +2/+4 Dorsalis Pedis  +2/+4 Posterior Tibialis  Capillary Refill < 2 Seconds.        Leg/Ankle/Foot - Ankle Impingement:  Posterior Ankle Impingement Test: Negative.   Anterior Ankle Impingement Test: Negative.         Leg/Ankle/Foot - Ankle Instability:  Flexibility Test:  Positive.   Anterior Drawer Test:  Negative.   Talar Tilt Test:  Negative.   External Rotation Kleiger Plantar Flexion Test: Positive  External Rotation Kleiger Dorsiflexion Test:  Positive  Squeeze Test:  Positive  Dorsiflexion Test:  Negative.   Posterior Tibial Instability Test: Negative.  Peroneal Tendon Instability Test:  Negative.  Horizontal Squeeze Test:  Positive.   Vertical Squeeze Test:  Positive.   Standing/Walking Test:  Positive, Painful.         Leg/Ankle/Foot - DVT:  Ev's Sign: Negative.         Leg/Ankle/Foot - Forefoot:  Strunsky Test:  Negative.   Gaenslen Maneuver Test:  Negative.   Metatarsal Tap Test: Negative.     Crepitation Test:  Negative.         Leg/Ankle/Foot - Hindfoot Achilles/Calcaneus:  Orourke Compression Test: Negative.   Hoffa Sign: Negative.   Achilles Tendon Tap Test: Negative.   Heel Compression Test: Negative.         Leg/Ankle/Foot - Nerve Irritation:  Andrea Sign: Negative.   Digital Nerve Stretch Test: Negative.   Tinel Sign: Negative.   Tourniquet Sign: Negative.         Feet/Foot:   Positive BILATERAL  Valgus foot     Imaging and Diagnostics Review:  Continued evidence of previously established fracture however patient does show bony  healing with some bridging of fracture evident    Assessment   1. Displaced fracture of lateral malleolus of left fibula, subsequent encounter for closed fracture with routine healing    2. Tear of deltoid ligament of ankle, left, subsequent encounter    3. Sprain of anterior talofibular ligament, left, sequela    4. Ankle joint effusion, left    5. Peroneal tenosynovitis    6. Achilles tendinosis of left ankle    7. Acute left ankle pain    8. Fall, subsequent encounter        Plan   Treatment or Intervention:  May use PRICE therapy as needed.  Start and continue Physical Therapy 1-2 times a week for 8-10 weeks with manual therapy as well as dry needling and IASTM  Again stressed the importance of wearing shoes with good stability control to help with the biomechanics affecting the lower legs  Again stressed the importance of wearing full foot insoles to help with the biomechanics affecting the lower legs  Recommendation over-the-counter calcium 500mg, 3 times a day with vitamin-D3 3658-2285+ units a day with food as well as a daily multivitamin  Recommendation over-the-counter Move Free for joint health  May take OTC Tylenol Extra Strength or OTC Tylenol Arthritis, taking one every 6-8 hours with food as needed for pain management.  Patient advised regarding the risk and/or potential adverse reactions and/or side effects of any prescribed medications along with any over-the-counter medications or any supplements used. Patient advised to seek immediate medical care if any adverse reactions occur. The patient and/or patient(s) parent(s) verbalized their understanding.  Discussed in detail with the patient to the level of their understanding the possibility in the future of regenerative injections versus corticosteroids injections,   Patient to continue TALL WALKING BOOT brace for their LEFT ANKLE injury/condition. May take boot off for physical therapy.    Diagnostic studies:  Interpreted By:  Deon Fitzgerald,   STUDY:  XR ANKLE LEFT 3+ VIEWS      INDICATION: Signs/Symptoms:post fall, swelling/pain in ankle.      COMPARISON: None      ACCESSION NUMBER(S): [id number]      ORDERING CLINICIAN: MASOOD ARDON      FINDINGS:  Nondisplaced oblique fracture left distal fibula at the metaphysis.      Slightly widened medial mortise could suggest a component of  ligamentous injury.      Midfoot arthrosis and some calcaneal spurs as well as prior ankle  sprains with posttraumatic heterotopic ossification.      IMPRESSION:   Nondisplaced oblique fracture left distal fibula at the metaphysis.      Slightly widened medial mortise could suggest a component of  ligamentous injury.      Signed by: Deon Fitzgerald 1/18/2025 6:10 PM  Dictation workstation:   [id number]    Activity Instructions, Restrictions, and Accommodations:      Consultations/Referrals:  Physical therapy    Follow-up:  Follow up 4 weeks rasheed  Total appointment time _30_ minutes. Greater than 50% spent counseling patient on results of physical exam, treatment options as well as results of ordered imaging and treatment for results, need for PT and expected outcomes, as well as discussing possible medications.    Jayy Andrews CNP

## 2025-03-10 ENCOUNTER — HOSPITAL ENCOUNTER (OUTPATIENT)
Dept: RADIOLOGY | Facility: CLINIC | Age: 80
Discharge: HOME | End: 2025-03-10
Payer: MEDICARE

## 2025-03-10 ENCOUNTER — OFFICE VISIT (OUTPATIENT)
Dept: SPORTS MEDICINE | Facility: CLINIC | Age: 80
End: 2025-03-10
Payer: MEDICARE

## 2025-03-10 VITALS
BODY MASS INDEX: 25.99 KG/M2 | DIASTOLIC BLOOD PRESSURE: 76 MMHG | HEART RATE: 63 BPM | HEIGHT: 65 IN | SYSTOLIC BLOOD PRESSURE: 122 MMHG | WEIGHT: 156 LBS

## 2025-03-10 DIAGNOSIS — S93.422D TEAR OF DELTOID LIGAMENT OF ANKLE, LEFT, SUBSEQUENT ENCOUNTER: ICD-10-CM
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
## 2025-03-10 DIAGNOSIS — M25.572 ACUTE LEFT ANKLE PAIN: ICD-10-CM

## 2025-03-10 DIAGNOSIS — S93.492S SPRAIN OF ANTERIOR TALOFIBULAR LIGAMENT, LEFT, SEQUELA: ICD-10-CM

## 2025-03-10 DIAGNOSIS — M65.979 PERONEAL TENOSYNOVITIS: ICD-10-CM

## 2025-03-10 DIAGNOSIS — M25.472 ANKLE JOINT EFFUSION, LEFT: ICD-10-CM

## 2025-03-10 DIAGNOSIS — S82.62XD DISPLACED FRACTURE OF LATERAL MALLEOLUS OF LEFT FIBULA, SUBSEQUENT ENCOUNTER FOR CLOSED FRACTURE WITH ROUTINE HEALING: ICD-10-CM

## 2025-03-10 DIAGNOSIS — M67.874 ACHILLES TENDINOSIS OF LEFT ANKLE: ICD-10-CM

## 2025-03-10 PROCEDURE — 3078F DIAST BP <80 MM HG: CPT | Performed by: NURSE PRACTITIONER

## 2025-03-10 PROCEDURE — 1036F TOBACCO NON-USER: CPT | Performed by: NURSE PRACTITIONER

## 2025-03-10 PROCEDURE — 1126F AMNT PAIN NOTED NONE PRSNT: CPT | Performed by: NURSE PRACTITIONER

## 2025-03-10 PROCEDURE — 99214 OFFICE O/P EST MOD 30 MIN: CPT | Performed by: NURSE PRACTITIONER

## 2025-03-10 PROCEDURE — 73610 X-RAY EXAM OF ANKLE: CPT | Mod: LT

## 2025-03-10 PROCEDURE — 3074F SYST BP LT 130 MM HG: CPT | Performed by: NURSE PRACTITIONER

## 2025-03-10 PROCEDURE — 1159F MED LIST DOCD IN RCRD: CPT | Performed by: NURSE PRACTITIONER

## 2025-03-10 ASSESSMENT — PAIN SCALES - GENERAL: PAINLEVEL_OUTOF10: 0-NO PAIN

## 2025-03-10 ASSESSMENT — ENCOUNTER SYMPTOMS
LOSS OF SENSATION IN FEET: 0
OCCASIONAL FEELINGS OF UNSTEADINESS: 0
DEPRESSION: 0

## 2025-03-13 NOTE — PROGRESS NOTES
Physical Therapy Treatment    Patient Name: Ana Bolton  MRN: [id number]  Encounter date:  3/14/2025  Time Calculation  Start Time: 0940  Stop Time: 1020  Time Calculation (min): 40 min     PT Therapeutic Procedures Time Entry  Manual Therapy Time Entry: 12  Therapeutic Exercise Time Entry: 26      Visit Number:  2 (including evaluation)  Planned total visits: 9  Visits Authorized/Insurance Coverage:  2/18-4/18 CPTs [id number] [id number] [phone]  02/12/2025: EVAL ONLY, AUTH REQUIRED, 30.00 CO PAY, 100% COVERAGE, ANTHEM     Current Problem  Problem List Items Addressed This Visit             ICD-10-CM    Acute left ankle pain - Primary M25.572            Precautions:  Lumbar stenosis with radiculopathy  Healing fracture- immobilized since 1/23- no ankle ROM until cleared by sports medicine  Updated 3/10 per sports medicine notes- discussion we will continue with current course of treatment of immobilization in her tall walking boot but patient can remove boot to do physical therapy and to do her home exercises as prescribed by PT.         Relevant PMH:  LBP  HTN  Lumbar stenosis with radiculopathy  CKD 3  OA  Bilateral TKA          Pain  Left ankle  Presently no pain    Subjective  General Patient in good spirits and compliant with HEP.  Patient continues to struggle with LBP.  Patient was recently told she is not a candidate for surgery.  Patient with good management of left ankle pain.  Patient removes boot  at night to sleep.     Per sports medicine notes 3/10-reviewed patient x-ray results in detail. Patient x-rays show continuation of previously established fracture however patient does show healing with some bony bridging and patient has decreased pain on exam. Patient is also able to ambulate and bear weight on the affected extremity with no pain. As such after discussion we will continue with current course of treatment of immobilization in her tall walking boot but patient can remove boot to do physical  "therapy and to do her home exercises as prescribed by PT.          Objective  PROM DF to +5 degrees    Treatment:  Therapeutic exercise  Gentle calf stretching with towel 20\" x 3  Ankle alphabet x 1  Seated calf raises 2 x 10  Towel scrunches x 10  LAQ 2 x 10  Iso hip ADD 2 x 10  Mint TB hip ABD 2 x 10      Manual  Patient supine  STM to calf   PROM stretching in to DF    Current HEP:  LAQ  Iso hip ADD  Mint TB hip ABD    Issued 3/14  Calf stretching with towel  Ankle alphabet  Seated calf raises  Towel scrunches      Has patient been compliant with HEP? Yes    Activity tolerance:  good    OP EDUCATION:  HEP  Boot when up       Assessment:  Pt's response to treatment:  good  Areas of improvements:  ROM  Limitations/deficits:  healing status    Pain end of session:   0/10 left ankle    Plan:  Continue with current POC with the following changes advance as able    Assessment of current progress against goals:  Progressing toward functional goals      Goals:  STG (3 visits)  Patient to be independent with HEP     LTG (9 visits)   LEFS to <20% impaired   Patient to perform ADLs with pain < 0/10   Patient to ambulate community distances with least restrictive device   AROM left ankle = right   MMT left ankle = right     "

## 2025-03-14 ENCOUNTER — TREATMENT (OUTPATIENT)
Dept: PHYSICAL THERAPY | Facility: CLINIC | Age: 80
End: 2025-03-14
Payer: MEDICARE

## 2025-03-14 DIAGNOSIS — M25.572 ACUTE LEFT ANKLE PAIN: Primary | ICD-10-CM

## 2025-03-14 PROCEDURE — 97110 THERAPEUTIC EXERCISES: CPT | Mod: GP

## 2025-03-14 PROCEDURE — 97140 MANUAL THERAPY 1/> REGIONS: CPT | Mod: GP

## 2025-03-19 NOTE — PROGRESS NOTES
"    Physical Therapy Treatment    Patient Name: Ana Bolton  MRN: [id number]  Encounter date:  3/20/2025  Time Calculation  Start Time: 1245  Stop Time: 1330  Time Calculation (min): 45 min     PT Therapeutic Procedures Time Entry  Manual Therapy Time Entry: 12  Therapeutic Exercise Time Entry: 28       Visit Number:  3 (including evaluation)  Planned total visits: 9  Visits Authorized/Insurance Coverage:  2/18/25: EVAL ONLY-Auth Rcvd 8 visits 2/18-4/18 CPTs [id number] [phone]  02/12/2025: EVAL ONLY, AUTH REQUIRED, 30.00 CO PAY, 100% COVERAGE, ANTHEM     Current Problem  Problem List Items Addressed This Visit             ICD-10-CM    Acute left ankle pain - Primary M25.572        Precautions:  Lumbar stenosis with radiculopathy  Healing fracture- immobilized since 1/23- no ankle ROM until cleared by sports medicine  Updated 3/10 per sports medicine notes- discussion we will continue with current course of treatment of immobilization in her tall walking boot but patient can remove boot to do physical therapy and to do her home exercises as prescribed by PT.         Relevant PMH:  LBP  HTN  Lumbar stenosis with radiculopathy  CKD 3  OA  Bilateral TKA       Pain  0/10    Subjective  General  Patient reports good tolerance of upgraded HEP.  Patient without pain to report.    Objective  AROM DF to neutral  Less guarded ROM all panes      Treatment:  Therapeutic exercise  All performed pain free  Gentle calf stretching with towel 20\" x 3  Ankle alphabet x 1  Seated calf raises 2 x 10  RB AP x 20  Towel scrunches x 10  LAQ 2 x 10  Iso hip ADD 2 x 10  Mint TB hip ABD 2 x 10        Manual  Patient supine  STM to calf   PROM stretching in to DF  PROM/AAROM DF/PF INV/EV X 15- gentle small ROM     Current HEP:  LAQ  Iso hip ADD  Mint TB hip ABD     Issued 3/14  Calf stretching with towel  Ankle alphabet  Seated calf raises  Towel scrunches       Has patient been compliant with HEP? Yes    Activity tolerance:  good    OP " EDUCATION:  HEP  Boot compliance    Assessment:  Pt's response to treatment:  HEP  Areas of improvements:  exercise tolerance  Limitations/deficits:  healing status/precautions    Pain end of session:   0/10    Plan:  Continue with current POC with the following changes consider add light resistive band to PF therapeutic exercise/HEP.    Assessment of current progress against goals:  Progressing toward functional goals      Goals:  STG (3 visits)  Patient to be independent with HEP     LTG (9 visits)   LEFS to <20% impaired   Patient to perform ADLs with pain < 0/10   Patient to ambulate community distances with least restrictive device   AROM left ankle = right   MMT left ankle = right

## 2025-03-20 ENCOUNTER — TREATMENT (OUTPATIENT)
Dept: PHYSICAL THERAPY | Facility: CLINIC | Age: 80
End: 2025-03-20
Payer: MEDICARE

## 2025-03-20 DIAGNOSIS — M25.572 ACUTE LEFT ANKLE PAIN: Primary | ICD-10-CM

## 2025-03-20 PROCEDURE — 97140 MANUAL THERAPY 1/> REGIONS: CPT | Mod: GP

## 2025-03-20 PROCEDURE — 97110 THERAPEUTIC EXERCISES: CPT | Mod: GP

## 2025-03-27 ENCOUNTER — TREATMENT (OUTPATIENT)
Dept: PHYSICAL THERAPY | Facility: CLINIC | Age: 80
End: 2025-03-27
Payer: MEDICARE

## 2025-03-27 DIAGNOSIS — M25.572 ACUTE LEFT ANKLE PAIN: Primary | ICD-10-CM

## 2025-03-27 PROCEDURE — 97140 MANUAL THERAPY 1/> REGIONS: CPT | Mod: GP

## 2025-03-27 PROCEDURE — 97110 THERAPEUTIC EXERCISES: CPT | Mod: GP

## 2025-03-27 NOTE — PROGRESS NOTES
"    Physical Therapy Treatment    Patient Name: Ana Bolton  MRN: [id number]  Encounter date:  3/27/2025  Time Calculation  Start Time: 1100  Stop Time: 1144  Time Calculation (min): 44 min     PT Therapeutic Procedures Time Entry  Manual Therapy Time Entry: 12  Therapeutic Exercise Time Entry: 29       Visit Number:  4 (including evaluation)  Planned total visits: 9  Visits Authorized/Insurance Coverage:  2/18/25: EVAL ONLY-Auth Rcvd 8 visits 2/18-4/18 CPTs [id number] [id number]  02/12/2025: EVAL ONLY, AUTH REQUIRED, 30.00 CO PAY, 100% COVERAGE, ANTHEM      Current Problem  Problem List Items Addressed This Visit             ICD-10-CM    Acute left ankle pain - Primary M25.572          Precautions:  Lumbar stenosis with radiculopathy  Healing fracture- immobilized since 1/23- no ankle ROM until cleared by sports medicine  Updated 3/10 per sports medicine notes- discussion we will continue with current course of treatment of immobilization in her tall walking boot but patient can remove boot to do physical therapy and to do her home exercises as prescribed by PT.         Relevant PMH:  LBP  HTN  Lumbar stenosis with radiculopathy  CKD 3  OA  Bilateral TKA          Pain  0/10    Subjective  General  Patient to follow up with sports medicine 4/7.  Patient pleased with progress.  Patient diligent with HEP.  Patient walks to bathroom at night as permitted.  Patient  wit    Objective  PROM DF to 10 degrees    Treatment:  Therapeutic exercise  All performed pain free  Gentle calf stretching with towel 20\" x 3    Ankle alphabet x 1    RB AP x 20  Towel scrunches x 20  LAQ 2 x 10  Iso hip ADD 2 x 10  Mint TB hip ABD 2 x 10  Seated calf raises 2 x 10    Orange band PF 2 x 10        Manual  Patient supine  STM to calf   PROM stretching in to DF  PROM/AAROM DF/PF INV/EV X 15- gentle small ROM     Current HEP:  LAQ  Iso hip ADD  mint TB hip ABD       Issued 3/14  Calf stretching with towel  Ankle alphabet  Seated calf " raises  Towel scrunches     Issued 3/27  [place] band PF     Has patient been compliant with HEP? Yes    Activity tolerance:  good    OP EDUCATION:  HEP  Boot when WB    Assessment:  Pt's response to treatment:  good  Areas of improvements:  exercise tolerance  Limitations/deficits:  LBP    Pain end of session:   0/10    Plan:  Continue with current POC with the following changes advance as able    Assessment of current progress against goals:  Progressing toward functional goals      Goals:  STG (3 visits)  Patient to be independent with HEP     LTG (9 visits)   LEFS to <20% impaired   Patient to perform ADLs with pain < 0/10   Patient to ambulate community distances with least restrictive device   AROM left ankle = right   MMT left ankle = right

## 2025-04-03 ENCOUNTER — TREATMENT (OUTPATIENT)
Dept: PHYSICAL THERAPY | Facility: CLINIC | Age: 80
End: 2025-04-03
Payer: MEDICARE

## 2025-04-03 DIAGNOSIS — M25.572 ACUTE LEFT ANKLE PAIN: Primary | ICD-10-CM

## 2025-04-03 PROCEDURE — 97110 THERAPEUTIC EXERCISES: CPT | Mod: GP

## 2025-04-03 PROCEDURE — 97140 MANUAL THERAPY 1/> REGIONS: CPT | Mod: GP

## 2025-04-03 ASSESSMENT — ENCOUNTER SYMPTOMS
RESPIRATORY NEGATIVE: 1
CARDIOVASCULAR NEGATIVE: 1
CONSTITUTIONAL NEGATIVE: 1
MYALGIAS: 1
ARTHRALGIAS: 1

## 2025-04-03 NOTE — PROGRESS NOTES
"    Physical Therapy Treatment    Patient Name: Ana Bolton  MRN: [id number]  Encounter date:  4/3/2025  Time Calculation  Start Time: 1100  Stop Time: 1143  Time Calculation (min): 43 min     PT Therapeutic Procedures Time Entry  Manual Therapy Time Entry: 10  Therapeutic Exercise Time Entry: 28       Visit Number:  5 (including evaluation)  Planned total visits: 9  Visits Authorized/Insurance Coverage:  2/18/25: EVAL ONLY-Auth Rcvd 8 visits 2/18-4/18 CPTs [id number]  02/12/2025: EVAL ONLY, AUTH REQUIRED, 30.00 CO PAY, 100% COVERAGE, ANTHEM   Plan of Care to Transfer to [facility] PT after 4/18/2025      Current Problem  Problem List Items Addressed This Visit             ICD-10-CM    Acute left ankle pain - Primary M25.572         Precautions:  Lumbar stenosis with radiculopathy  Healing fracture- immobilized since 1/23- no ankle ROM until cleared by sports medicine  Updated 3/10 per sports medicine notes- discussion we will continue with current course of treatment of immobilization in her tall walking boot but patient can remove boot to do physical therapy and to do her home exercises as prescribed by PT.        Relevant PMH:  LBP  HTN  Lumbar stenosis with radiculopathy  CKD 3  OA  Bilateral TKA       Pain  0/10     Subjective  General  Patient in good spirits and eager to discontinue boot.  Patient to follow up with sports medicine 4/7.  Patient diligent with HEP without issues.      Objective  PROM DF to +10 degrees    Treatment:  Therapeutic exercise  All performed pain free  Gentle calf stretching with towel 20\" x 3     Ankle alphabet x 1     RB AP x 20  Towel scrunches x 20  LAQ 2 x 10  Iso hip ADD 2 x 10  Mint TB hip ABD 2 x 10  Seated calf raises 2 x 10     Orange band PF 2 x 10       Manual  Patient supine  STM to calf   PROM stretching in to DF  PROM/AAROM DF/PF INV/EV X 15- gentle small ROM     Current HEP:  LAQ  Iso hip ADD  mint TB hip ABD        Issued 3/14  Calf stretching " with towel  Ankle alphabet  Seated calf raises  Towel scrunches     Issued 3/27  [place] band PF       Has patient been compliant with HEP? Yes    Activity tolerance:  good    OP EDUCATION:  HEP  Boot as instructed    Assessment:  Pt's response to treatment:  good  Areas of improvements:  good  Limitations/deficits:  healing status    Pain end of session:   0/10    Plan:  Continue with current POC with the following changes advance as per sports medicine instruction after follow up 4/7.    Assessment of current progress against goals:  Progressing toward functional goals      Goals:  STG (3 visits)  Patient to be independent with HEP     LTG (9 visits)   LEFS to <20% impaired   Patient to perform ADLs with pain < 0/10   Patient to ambulate community distances with least restrictive device   AROM left ankle = right   MMT left ankle = right

## 2025-04-03 NOTE — PATIENT INSTRUCTIONS
May use PRICE therapy as needed.  Continue Physical Therapy 1-2 times a week for 8-10 weeks with manual therapy as well as dry needling and IASTM  Again stressed the importance of wearing shoes with good stability control to help with the biomechanics affecting the lower legs  Again stressed the importance of wearing full foot insoles to help with the biomechanics affecting the lower legs  Recommendation over-the-counter calcium 500mg, 3 times a day with vitamin-D3 3403-5865+ units a day with food as well as a daily multivitamin  Recommendation over-the-counter Move Free for joint health  May take OTC Tylenol Extra Strength or OTC Tylenol Arthritis, taking one every 6-8 hours with food as needed for pain management.  Patient advised regarding the risk and/or potential adverse reactions and/or side effects of any prescribed medications along with any over-the-counter medications or any supplements used. Patient advised to seek immediate medical care if any adverse reactions occur. The patient and/or patient(s) parent(s) verbalized their understanding.  Discussed in detail with the patient to the level of their understanding the possibility in the future of regenerative injections versus corticosteroids injections,   Patient was given a WRAPTOR SPEED ANKLE brace for their LEFT ANKLE injury/condition. The patient is ambulatory with or without aid and feels more stable with the brace on. The brace definitely helps improve their function as well as stability. Verbal and written instructions for the use, wear schedule, cleaning, and application of this brace were given. Patient was instructed that should the brace result in increased pain, decreased sensation, numbness and/or tingling, increased swelling, or an overall worsening of their medical condition; to please contact our office immediately. Orthotic/brace management and training was provided for skin care, modifications due to healing tissues, edema changes,  interruption in skin integrity, and safety precautions with the Orthosis/brace.   Follow up as needed

## 2025-04-03 NOTE — PROGRESS NOTES
Established patient  History Of Present Illness  Ana Bolton is a 79 y.o. female presenting with LEFT ankle pain. Presents wearing tall walking boot as previously fitted for. Since last visit in office, patient states that she feels improved. Rates current pain as a 0/10. She continues with her home exercises from physical therapy noticing improvement.  We reviewed patient x-ray results in detail.  Patient x-rays show continued healing of previously established fracture.  Patient does still have a clearly visible fracture line but on exam has no pain and has normal range of motion and strength.  As such after discussion we will have patient transition into a stabilizing ankle brace as she has been in a tall walking boot for the last 10 to 12 weeks.  Patient can continue with physical therapy and discharged and will continue with her calcium and vitamin D.  Patient can follow-up as needed for continued or further complaints of pain or disability.  Patient verbalizes understanding agreement with plan of care.    Past Medical History  She has a past medical history of Allergic contact dermatitis due to plants, except food (07/16/2019), Body mass index (BMI) 26.0-26.9, adult (05/10/2021), Breast mass (05/23/2023), Encounter for general adult medical examination without abnormal findings (04/08/2016), Encounter for screening, unspecified (12/16/2014), Essential (primary) hypertension (03/18/2013), Impacted cerumen, bilateral (12/30/2020), Low back pain, unspecified (04/14/2020), Lumbar radiculopathy (05/23/2023), Osteoarthritis of knee (05/23/2023), Other conditions influencing health status (03/17/2014), Other dental procedure status, 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), Personal history of other benign neoplasm (04/03/2013), Personal history of other diseases of urinary system (12/16/2014), Personal history of other endocrine, nutritional and metabolic disease (05/08/2020), Personal history of  "other infectious and parasitic diseases (06/10/2020), Personal history of other specified conditions (03/22/2017), Personal history of urinary calculi (03/22/2017), Polyosteoarthritis, unspecified (05/23/2023), Pure hypercholesterolemia, unspecified (03/18/2013), 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    Surgical History  She has a past surgical history that includes Knee surgery (03/18/2013).     Social History  She reports that she has never smoked. She has never used smokeless tobacco. She reports that she does not currently use alcohol. She reports that she does not use drugs.    Family History  Family History   Problem Relation Name Age of Onset    Colon cancer Mother      Heart attack Father      Breast cancer Sister      Other (cabg) Brother      Heart disease Brother       Allergies  Sulfa (sulfonamide antibiotics)    Review of Systems  Review of Systems   Constitutional: Negative.    Respiratory: Negative.     Cardiovascular: Negative.    Musculoskeletal:  Positive for arthralgias and myalgias.   All other systems reviewed and are negative.    Last Recorded Vitals  /76 (BP Location: Right arm, Patient Position: Sitting, BP Cuff Size: Adult)   Pulse 63   Ht 1.651 m (5' 5\")   Wt 70.8 kg (156 lb)   BMI 25.96 kg/m²      The , MASOOD CODY was present during today's visit and not limited to physical examination!    Examination:  Left Ankle and/or Foot  Edema: Negative.   Ecchymosis/Bruising: Negative.   Percussion Test: Negative.      Tuning Fork Test: Negative.     Orientation:   Negative.          ROM:   Negative.            Muscle Strength:   +5/+5 Planar Flexion,   +5/+5  Dorsi Flexion,     +5/+5 Inversion  +5/+5 Eversion        +5/+5 Plantarflexion in combination with inversion  +5/+5 Plantarflexion in combination with eversion          +5/+5 " Dorsiflexion in combination with inversion (Posterior Tibialis)  +5/+5 Dorsiflexion in combination with eversion (Peroneal Brevis)  +5/+5 Dorsiflexion in combination with eversion and flexion of great toe (Peroneal Longus).            Palpation:   Negative.            Vascular:   +2/+4 Dorsalis Pedis  +2/+4 Posterior Tibialis  Capillary Refill < 2 Seconds.        Leg/Ankle/Foot - Ankle Impingement:  Posterior Ankle Impingement Test: Negative.   Anterior Ankle Impingement Test: Negative.         Leg/Ankle/Foot - Ankle Instability:  Flexibility Test:  Negative.   Anterior Drawer Test:  Negative.   Talar Tilt Test:  Negative.   External Rotation Kleiger Plantar Flexion Test: Negative.   External Rotation Kleiger Dorsiflexion Test: Negative.   Squeeze Test: Negative.   Dorsiflexion Test:  Negative.   Posterior Tibial Instability Test: Negative.  Peroneal Tendon Instability Test:  Negative.  Horizontal Squeeze Test: Negative.   Vertical Squeeze Test:  Negative.   Standing/Walking Test:  Negative.        Leg/Ankle/Foot - DVT:  Ev's Sign: Negative.         Leg/Ankle/Foot - Forefoot:  Strunsky Test:  Negative.   Gaenslen Maneuver Test:  Negative.   Metatarsal Tap Test: Negative.     Crepitation Test:  Negative.         Leg/Ankle/Foot - Hindfoot Achilles/Calcaneus:  Orourke Compression Test: Negative.   Hoffa Sign: Negative.   Achilles Tendon Tap Test: Negative.   Heel Compression Test: Negative.         Leg/Ankle/Foot - Nerve Irritation:  Andrea Sign: Negative.   Digital Nerve Stretch Test: Negative.   Tinel Sign: Negative.   Tourniquet Sign: Negative.         Feet/Foot:   Positive BILATERAL  Valgus foot     Imaging and Diagnostics Review:  Continued evidence of previously established fracture however patient does show bony healing with continued bridging of fracture evident    Assessment   1. Displaced fracture of lateral malleolus of left fibula, subsequent encounter for closed fracture with routine healing    2. Tear  of deltoid ligament of ankle, left, subsequent encounter    3. Sprain of anterior talofibular ligament, left, sequela    4. Ankle joint effusion, left    5. Peroneal tenosynovitis    6. Achilles tendinosis of left ankle    7. Acute left ankle pain    8. Fall, subsequent encounter          Plan   Treatment or Intervention:  May use PRICE therapy as needed.  Continue Physical Therapy 1-2 times a week for 8-10 weeks with manual therapy as well as dry needling and IASTM  Again stressed the importance of wearing shoes with good stability control to help with the biomechanics affecting the lower legs  Again stressed the importance of wearing full foot insoles to help with the biomechanics affecting the lower legs  Recommendation over-the-counter calcium 500mg, 3 times a day with vitamin-D3 9629-5989+ units a day with food as well as a daily multivitamin  Recommendation over-the-counter Move Free for joint health  May take OTC Tylenol Extra Strength or OTC Tylenol Arthritis, taking one every 6-8 hours with food as needed for pain management.  Patient advised regarding the risk and/or potential adverse reactions and/or side effects of any prescribed medications along with any over-the-counter medications or any supplements used. Patient advised to seek immediate medical care if any adverse reactions occur. The patient and/or patient(s) parent(s) verbalized their understanding.  Discussed in detail with the patient to the level of their understanding the possibility in the future of regenerative injections versus corticosteroids injections,   Patient was given a WRAPTOR SPEED ANKLE brace for their LEFT ANKLE injury/condition. The patient is ambulatory with or without aid and feels more stable with the brace on. The brace definitely helps improve their function as well as stability. Verbal and written instructions for the use, wear schedule, cleaning, and application of this brace were given. Patient was instructed that  should the brace result in increased pain, decreased sensation, numbness and/or tingling, increased swelling, or an overall worsening of their medical condition; to please contact our office immediately. Orthotic/brace management and training was provided for skin care, modifications due to healing tissues, edema changes, interruption in skin integrity, and safety precautions with the Orthosis/brace.    Diagnostic studies:  Interpreted By:  Deon Fitzgerald,   STUDY: XR ANKLE LEFT 3+ VIEWS      INDICATION: Signs/Symptoms:post fall, swelling/pain in ankle.      COMPARISON: None      ACCESSION NUMBER(S): [id number]      ORDERING CLINICIAN: MASOOD ARDON      FINDINGS:  Nondisplaced oblique fracture left distal fibula at the metaphysis.      Slightly widened medial mortise could suggest a component of  ligamentous injury.      Midfoot arthrosis and some calcaneal spurs as well as prior ankle  sprains with posttraumatic heterotopic ossification.      IMPRESSION:   Nondisplaced oblique fracture left distal fibula at the metaphysis.      Slightly widened medial mortise could suggest a component of  ligamentous injury.      Signed by: Deon Fitzgerald 1/18/2025 6:10 PM  Dictation workstation:   [id number]    Activity Instructions, Restrictions, and Accommodations:      Consultations/Referrals:  Physical therapy    Follow-up:  Follow up as needed  Total appointment time _30_ minutes. Greater than 50% spent counseling patient on results of physical exam, treatment options as well as results of ordered imaging and treatment for results, need for PT and expected outcomes, as well as discussing possible medications.    Jayy Andrews CNP

## 2025-04-07 ENCOUNTER — HOSPITAL ENCOUNTER (OUTPATIENT)
Dept: RADIOLOGY | Facility: CLINIC | Age: 80
Discharge: HOME | End: 2025-04-07
Payer: MEDICARE

## 2025-04-07 ENCOUNTER — OFFICE VISIT (OUTPATIENT)
Dept: SPORTS MEDICINE | Facility: CLINIC | Age: 80
End: 2025-04-07
Payer: MEDICARE

## 2025-04-07 VITALS
SYSTOLIC BLOOD PRESSURE: 122 MMHG | HEART RATE: 63 BPM | BODY MASS INDEX: 25.99 KG/M2 | HEIGHT: 65 IN | DIASTOLIC BLOOD PRESSURE: 76 MMHG | WEIGHT: 156 LBS

## 2025-04-07 DIAGNOSIS — S82.62XD DISPLACED FRACTURE OF LATERAL MALLEOLUS OF LEFT FIBULA, SUBSEQUENT ENCOUNTER FOR CLOSED FRACTURE WITH ROUTINE HEALING: ICD-10-CM

## 2025-04-07 DIAGNOSIS — M25.572 ACUTE LEFT ANKLE PAIN: ICD-10-CM

## 2025-04-07 DIAGNOSIS — M65.979 PERONEAL TENOSYNOVITIS: ICD-10-CM

## 2025-04-07 DIAGNOSIS — S93.492S SPRAIN OF ANTERIOR TALOFIBULAR LIGAMENT, LEFT, SEQUELA: ICD-10-CM
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
## 2025-04-07 DIAGNOSIS — S93.422D TEAR OF DELTOID LIGAMENT OF ANKLE, LEFT, SUBSEQUENT ENCOUNTER: ICD-10-CM

## 2025-04-07 DIAGNOSIS — M25.472 ANKLE JOINT EFFUSION, LEFT: ICD-10-CM

## 2025-04-07 DIAGNOSIS — M67.874 ACHILLES TENDINOSIS OF LEFT ANKLE: ICD-10-CM

## 2025-04-07 PROCEDURE — 1036F TOBACCO NON-USER: CPT | Performed by: NURSE PRACTITIONER

## 2025-04-07 PROCEDURE — 3074F SYST BP LT 130 MM HG: CPT | Performed by: NURSE PRACTITIONER

## 2025-04-07 PROCEDURE — L1902 AFO ANKLE GAUNTLET PRE OTS: HCPCS | Performed by: NURSE PRACTITIONER

## 2025-04-07 PROCEDURE — 73610 X-RAY EXAM OF ANKLE: CPT | Mod: LT

## 2025-04-07 PROCEDURE — 73610 X-RAY EXAM OF ANKLE: CPT | Mod: LEFT SIDE | Performed by: RADIOLOGY

## 2025-04-07 PROCEDURE — 3078F DIAST BP <80 MM HG: CPT | Performed by: NURSE PRACTITIONER

## 2025-04-07 PROCEDURE — 1159F MED LIST DOCD IN RCRD: CPT | Performed by: NURSE PRACTITIONER

## 2025-04-07 PROCEDURE — 99214 OFFICE O/P EST MOD 30 MIN: CPT | Performed by: NURSE PRACTITIONER

## 2025-04-07 ASSESSMENT — ENCOUNTER SYMPTOMS
LOSS OF SENSATION IN FEET: 0
DEPRESSION: 0
OCCASIONAL FEELINGS OF UNSTEADINESS: 1

## 2025-04-08 NOTE — PROGRESS NOTES
"    Physical Therapy Treatment    Patient Name: Ana Bolton  MRN: [id number]  Encounter date:  4/10/2025  Time Calculation  Start Time: 1115  Stop Time: 1205  Time Calculation (min): 50 min     PT Therapeutic Procedures Time Entry  Manual Therapy Time Entry: 15  Therapeutic Exercise Time Entry: 30       Visit Number:  6 (including evaluation)  Planned total visits: 9  Visits Authorized/Insurance Coverage:  2/18/25: EVAL ONLY-Auth Rcvd 8 visits 2/18-4/18 CPTs [id number]  02/12/2025: EVAL ONLY, AUTH REQUIRED, 30.00 CO PAY, 100% COVERAGE, ANTHEM   Plan of Care to Transfer to [facility] PT after 4/18/2025      Current Problem  Problem List Items Addressed This Visit             ICD-10-CM    Acute left ankle pain M25.572       Precautions:  Lumbar stenosis with radiculopathy  Healing fracture- immobilized since 1/23- no ankle ROM until cleared by sports medicine  Updated 3/10 per sports medicine notes- discussion we will continue with current course of treatment of immobilization in her tall walking boot but patient can remove boot to do physical therapy and to do her home exercises as prescribed by PT.      Relevant PMH:  LBP  HTN  Lumbar stenosis with radiculopathy  CKD 3  OA  Bilateral TKA     Pain  0/10     Subjective  General  Patient was cleared to discontinue wearing walking boot and provided an ankle stabilizing brace at follow up with sports medicine. Patient relayed increased low back pain coming into the appointment today pt attributes to the rainy weather.     Objective   Pt entered w/ stabilizing ankle brace donned on R ankle, brace doffed for STM and therex. Patient ambulated with SPC, mild forward lean w/ scoliotic curve due to increased low back pain.     Treatment:  Therapeutic exercise  All performed pain free  Gentle calf stretching with towel 20\" x 3     Ankle alphabet x 1     RB AP x 20  Towel scrunches x 20  LAQ 2 x 10  Iso hip ADD 2 x 10, 5\" hold  Mint TB hip ABD 2 x 10  Seated " calf raises 2 x 10     Orange band PF 2 x 10    NuStep x 5' Level 1     Manual  Patient long sitting w/ raised head of table, BLE over bolster  STM to calf   PROM stretching in to DF  PROM/AAROM DF/PF INV/EV X 15- gentle small ROM     Current HEP:  LAQ  Iso hip ADD  mint TB hip ABD      Issued 3/14Calf stretching with towel  Ankle alphabet  Seated calf raises  Towel scrunches     Issued 3/27  [place] band PF    Has patient been compliant with HEP? Yes    Activity tolerance:  good    OP EDUCATION:  HEP  Boot as instructed    Assessment:  Pt's response to treatment: Good response to introduction of NuStep to improve BLE strength and cardiovascular fitness. Potential to progress exercises to include resistance for seated therex and include standing activity as tolerated.   Areas of improvements: R ankle fracture is healing. Improved ankle stability and RLE strength.   Limitations/deficits: Current stage of healing of R ankle fracture and back pain limits ability to perform ADLs     Pain end of session:   0/10    Plan:  Continue with current POC with the following changes advance as per sports medicine instruction after follow up 4/7.  Progress to WB exercise as appropriate/tolerated.    Assessment of current progress against goals:  Progressing toward functional goals      Goals:  STG (3 visits)  Patient to be independent with HEP     LTG (9 visits)   LEFS to <20% impaired   Patient to perform ADLs with pain < 0/10   Patient to ambulate community distances with least restrictive device   AROM left ankle = right   MMT left ankle = right

## 2025-04-10 ENCOUNTER — TREATMENT (OUTPATIENT)
Dept: PHYSICAL THERAPY | Facility: CLINIC | Age: 80
End: 2025-04-10
Payer: MEDICARE

## 2025-04-10 DIAGNOSIS — M25.572 ACUTE LEFT ANKLE PAIN: ICD-10-CM

## 2025-04-10 PROCEDURE — 97140 MANUAL THERAPY 1/> REGIONS: CPT | Mod: CQ,GP | Performed by: SPECIALIST/TECHNOLOGIST

## 2025-04-10 PROCEDURE — 97110 THERAPEUTIC EXERCISES: CPT | Mod: CQ,GP | Performed by: SPECIALIST/TECHNOLOGIST

## 2025-04-14 NOTE — PROGRESS NOTES
Physical Therapy Discharge / Treatment    Patient Name: Ana Bolton  MRN: [id number]  Encounter date:  4/17/2025  Time Calculation  Start Time: 0915  Stop Time: 0959  Time Calculation (min): 44 min     PT Therapeutic Procedures Time Entry  Therapeutic Exercise Time Entry: 25    Visit Number:  7 (including evaluation)  Planned total visits: 9  Visits Authorized/Insurance Coverage:  2/18/25: EVAL ONLY-Auth Rcvd 8 visits 2/18-4/18 CPTs [id number]  02/12/2025: EVAL ONLY, AUTH REQUIRED, 30.00 CO PAY, 100% COVERAGE, ANTHEM   Plan of Care to Transfer to [facility] PT after 4/18/2025      Current Problem  Problem List Items Addressed This Visit           ICD-10-CM    Acute left ankle pain M25.572        Precautions  Precautions  Precautions Comment: lace ip ankle brace with velcro straps (figure 8)    Lumbar stenosis with radiculopathy  Healing fracture- immobilized since 1/23- no ankle ROM until cleared by sports medicine  Updated 3/10 per sports medicine notes- discussion we will continue with current course of treatment of immobilization in her tall walking boot but patient can remove boot to do physical therapy and to do her home exercises as prescribed by PT.     Pain  Pain Assessment: 0-10  0-10 (Numeric) Pain Score: 0 - No pain (Pain has been 0 without the walking boot and lace up brace)  Response to Interventions: No change in pain    Subjective  General  General Comment: Requests DC    PT  Visit  Response to Previous Treatment: Patient with no complaints from previous session.    Objective    Range of Motion:  Ankle AROM L*   Dorsiflexion WFL   Plantarflexion WFL   Eversion WFL   Inversion WFL      Strength:  Ankle MMT L   Dorsiflexion Tested to 3+   Plantarflexion Tested to 3+   Eversion Tested to 3+   Inversion Tested to 3+         Circumferential measurement (joint line)  Left figure 8 51 cm,   Modest swelling left ankle        Gait:  Ambulate:  Walks with SPC due to back issues    "  Transfers:  UE A     Outcome Measures:  LEFS  60/80    Treatment:  Therapeutic Exercise  Therapeutic Exercise Performed: Yes  Gentle calf stretching with towel 20\" x 3     AROM    Ankle alphabet x 1     RB AP x 20  Towel scrunches x 20  LAQ 2 x 10  Iso hip ADD 2 x 10, 5\" hold  Black strap ABD 2 x 10  Seated calf raises 2 x 10     Green TB PF 2 x 10     NuStep x 5' Level 1 DNP       Current HEP:  LAQ  Iso hip ADD  mint TB hip ABD      Issued 3/14Calf stretching with towel  Ankle alphabet  Seated calf raises  Towel scrunches     Issued 3/27  [place] band PF    Has patient been compliant with HEP?  Yes    Billed Treatment Times:  Therapeutic Exercise 25 min    OP EDUCATION:  Outpatient Education  Individual(s) Educated: Patient  Education Provided: Home Exercise Program  Education Comment: reviewed    Assessment:  PT Assessment  Assessment Comment: Pt states she feels ready to be DC'd.  She was given the opporutnity for her POC to extended (pending insurance approval)  but feels she can continue with her current HEP.  Areas of improvements:  Decreased pain, Improved stability, Improved strength, and Improved gait pattern  Limitations/deficits:  Weakness and healing fracture    Plan:     Discharge    Assessment of current progress against goals:  Functionally independent, goals met:  Date met 04/17/2025    Goals:  Resolved       PT Problem - left ankle       PT Goal 1 -STG (Adequate for Discharge)       Start:  04/14/25    Expected End:  05/29/25    Resolved:  04/17/25    STG (3 visits)  1. Patient to be independent with HEP                   PT Goal 2 - LTG (Adequate for Discharge)       Start:  04/14/25    Expected End:  07/13/25    Resolved:  04/17/25    LTG (10 visits)- pending auth  1.  LEFS to <20% impaired  2.  Patient to perform ADLs with pain < 0/10  3.  Patient to ambulate community distances with least restrictive device -   4.  AROM left ankle = right  5.  MMT left ankle = right         Patient Stated Goal " 1 (Adequate for Discharge)       Start:  04/14/25    Expected End:  07/13/25    Resolved:  04/17/25    None stated at time of eval.

## 2025-04-17 ENCOUNTER — TREATMENT (OUTPATIENT)
Dept: PHYSICAL THERAPY | Facility: CLINIC | Age: 80
End: 2025-04-17
Payer: MEDICARE

## 2025-04-17 DIAGNOSIS — M25.572 ACUTE LEFT ANKLE PAIN: ICD-10-CM

## 2025-04-17 PROCEDURE — 97110 THERAPEUTIC EXERCISES: CPT | Mod: GP | Performed by: PHYSICAL THERAPIST

## 2025-04-17 ASSESSMENT — PAIN SCALES - GENERAL: PAINLEVEL_OUTOF10: 0 - NO PAIN

## 2025-04-17 ASSESSMENT — PAIN - FUNCTIONAL ASSESSMENT: PAIN_FUNCTIONAL_ASSESSMENT: 0-10

## 2025-04-29 DIAGNOSIS — Z12.11 SPECIAL SCREENING FOR MALIGNANT NEOPLASMS, COLON: ICD-10-CM

## 2025-04-29 RX ORDER — SODIUM, POTASSIUM,MAG SULFATES 17.5-3.13G
SOLUTION, RECONSTITUTED, ORAL ORAL
Qty: 1 EACH | Refills: 0 | Status: SHIPPED | OUTPATIENT
Start: 2025-04-29

## 2025-06-10 ENCOUNTER — HOSPITAL ENCOUNTER (OUTPATIENT)
Dept: RADIOLOGY | Facility: HOSPITAL | Age: 80
Discharge: HOME | End: 2025-06-10
Payer: MEDICARE

## 2025-06-10 DIAGNOSIS — M54.50 CHRONIC BILATERAL LOW BACK PAIN WITHOUT SCIATICA: ICD-10-CM

## 2025-06-10 DIAGNOSIS — G89.29 CHRONIC BILATERAL LOW BACK PAIN WITHOUT SCIATICA: ICD-10-CM

## 2025-06-10 PROCEDURE — 72082 X-RAY EXAM ENTIRE SPI 2/3 VW: CPT

## 2025-06-10 PROCEDURE — 72082 X-RAY EXAM ENTIRE SPI 2/3 VW: CPT | Performed by: STUDENT IN AN ORGANIZED HEALTH CARE EDUCATION/TRAINING PROGRAM

## 2025-06-14 ENCOUNTER — OFFICE VISIT (OUTPATIENT)
Dept: URGENT CARE | Age: 80
End: 2025-06-14
Payer: MEDICARE

## 2025-06-14 VITALS
WEIGHT: 152 LBS | OXYGEN SATURATION: 100 % | HEART RATE: 59 BPM | HEIGHT: 66 IN | SYSTOLIC BLOOD PRESSURE: 145 MMHG | RESPIRATION RATE: 16 BRPM | BODY MASS INDEX: 24.43 KG/M2 | DIASTOLIC BLOOD PRESSURE: 77 MMHG | TEMPERATURE: 97.6 F

## 2025-06-14 DIAGNOSIS — L23.7 POISON IVY DERMATITIS: Primary | ICD-10-CM

## 2025-06-14 PROCEDURE — 3078F DIAST BP <80 MM HG: CPT | Performed by: NURSE PRACTITIONER

## 2025-06-14 PROCEDURE — 99203 OFFICE O/P NEW LOW 30 MIN: CPT | Performed by: NURSE PRACTITIONER

## 2025-06-14 PROCEDURE — 1126F AMNT PAIN NOTED NONE PRSNT: CPT | Performed by: NURSE PRACTITIONER

## 2025-06-14 PROCEDURE — 1159F MED LIST DOCD IN RCRD: CPT | Performed by: NURSE PRACTITIONER

## 2025-06-14 PROCEDURE — 1036F TOBACCO NON-USER: CPT | Performed by: NURSE PRACTITIONER

## 2025-06-14 PROCEDURE — 3077F SYST BP >= 140 MM HG: CPT | Performed by: NURSE PRACTITIONER

## 2025-06-14 RX ORDER — PREDNISONE 20 MG/1
TABLET ORAL
Qty: 23 TABLET | Refills: 0 | Status: SHIPPED | OUTPATIENT
Start: 2025-06-14 | End: 2025-06-29

## 2025-06-14 RX ORDER — PREGABALIN 150 MG/1
1 CAPSULE ORAL
COMMUNITY
Start: 2025-04-29

## 2025-06-14 RX ORDER — LIDOCAINE 50 MG/G
OINTMENT TOPICAL
COMMUNITY

## 2025-06-14 RX ORDER — BETAMETHASONE DIPROPIONATE 0.5 MG/G
CREAM TOPICAL 2 TIMES DAILY
Qty: 60 G | Refills: 0 | Status: SHIPPED | OUTPATIENT
Start: 2025-06-14 | End: 2025-06-28

## 2025-06-14 ASSESSMENT — PAIN SCALES - GENERAL: PAINLEVEL_OUTOF10: 0-NO PAIN

## 2025-06-14 NOTE — PROGRESS NOTES
"Subjective   Patient ID: Ana Bolton is a 79 y.o. female. They present today with a chief complaint of Rash (On left side of neck and maybe some under left breast. ).    History of Present Illness  Poisin ivy rash on the back of neck and front and also on the left side of the chest      Rash        Past Medical History  Allergies as of 06/14/2025 - Reviewed 06/14/2025   Allergen Reaction Noted    Sulfa (sulfonamide antibiotics) Other 05/23/2023       Prescriptions Prior to Admission[1]     Medical History[2]    Surgical History[3]     reports that she has never smoked. She has never used smokeless tobacco. She reports that she does not currently use alcohol. She reports that she does not use drugs.    Review of Systems  Review of Systems   Skin:  Positive for rash.   All other systems reviewed and are negative.                                 Objective    Vitals:    06/14/25 1444   BP: 145/77   BP Location: Right arm   Patient Position: Sitting   Pulse: 59   Resp: 16   Temp: 36.4 °C (97.6 °F)   TempSrc: Oral   SpO2: 100%   Weight: 68.9 kg (152 lb)   Height: 1.676 m (5' 6\")     No LMP recorded. Patient is postmenopausal.    Physical Exam  Vitals reviewed.   Constitutional:       Appearance: Normal appearance.   Pulmonary:      Effort: Pulmonary effort is normal.   Skin:     Findings: Rash present. Rash is macular, papular and urticarial.          Neurological:      Mental Status: She is alert.         Procedures    Point of Care Test & Imaging Results from this visit  No results found for this visit on 06/14/25.   Imaging  No results found.    Cardiology, Vascular, and Other Imaging  No other imaging results found for the past 2 days      Diagnostic study results (if any) were reviewed by URSULA Cai.    Assessment/Plan   Allergies, medications, history, and pertinent labs/EKGs/Imaging reviewed by URSULA Cai.     Medical Decision Making  Start prednisone taper  Topical " betamethasone  And zyrtec OTC  Return for worsening or persistent rash    Orders and Diagnoses  Encounter Diagnosis   Name Primary?    Poison ivy dermatitis Yes         Medical Admin Record      Patient disposition: Home    Electronically signed by URSULA Cai  2:47 PM           [1] (Not in a hospital admission)  [2]   Past Medical History:  Diagnosis Date    Allergic contact dermatitis due to plants, except food 07/16/2019    Contact dermatitis due to poison ivy    Body mass index (BMI) 26.0-26.9, adult 05/10/2021    Body mass index (BMI) of 26.0 to 26.9 in adult    Breast mass 05/23/2023    Encounter for general adult medical examination without abnormal findings 04/08/2016    Annual physical exam    Encounter for screening, unspecified 12/16/2014    Screening procedure    Essential (primary) hypertension 03/18/2013    Benign essential hypertension    Impacted cerumen, bilateral 12/30/2020    Bilateral impacted cerumen    Low back pain, unspecified 04/14/2020    Acute low back pain    Lumbar radiculopathy 05/23/2023    Osteoarthritis of knee 05/23/2023    Other conditions influencing health status 03/17/2014    Mammogram    Other dental procedure status     Other dental procedure status    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    Post herpetic neuralgia    Personal history of other benign neoplasm 04/03/2013    History of other benign neoplasm    Personal history of other diseases of urinary system 12/16/2014    History of hematuria    Personal history of other endocrine, nutritional and metabolic disease 05/08/2020    History of estrogen deficiency    Personal history of other infectious and parasitic diseases 06/10/2020    History of herpes zoster    Personal history of other specified conditions 03/22/2017    History of motion sickness    Personal history of urinary calculi 03/22/2017    History of renal calculi    Polyosteoarthritis, 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    Pure  hypercholesterolemia, unspecified 03/18/2013    Low-density-lipoid-type (LDL) hyperlipoproteinemia    Sacroiliac joint dysfunction of right side 05/23/2023    Sciatica, unspecified side 02/14/2020    Sciatic pain    Unspecified symptoms and signs involving the genitourinary system 12/04/2014    Urinary symptom or sign   [3]   Past Surgical History:  Procedure Laterality Date    KNEE SURGERY  03/18/2013    Knee Surgery

## 2025-06-23 ENCOUNTER — APPOINTMENT (OUTPATIENT)
Dept: ORTHOPEDIC SURGERY | Facility: CLINIC | Age: 80
End: 2025-06-23
Payer: MEDICARE

## 2025-06-23 VITALS — BODY MASS INDEX: 24.43 KG/M2 | HEIGHT: 66 IN | WEIGHT: 152 LBS

## 2025-06-23 DIAGNOSIS — G89.29 CHRONIC BILATERAL LOW BACK PAIN WITHOUT SCIATICA: ICD-10-CM

## 2025-06-23 DIAGNOSIS — M54.50 CHRONIC BILATERAL LOW BACK PAIN WITHOUT SCIATICA: ICD-10-CM

## 2025-06-23 DIAGNOSIS — M41.50 DEGENERATIVE SCOLIOSIS IN ADULT PATIENT: Primary | ICD-10-CM

## 2025-06-23 PROCEDURE — 1159F MED LIST DOCD IN RCRD: CPT | Performed by: STUDENT IN AN ORGANIZED HEALTH CARE EDUCATION/TRAINING PROGRAM

## 2025-06-23 PROCEDURE — 1125F AMNT PAIN NOTED PAIN PRSNT: CPT | Performed by: STUDENT IN AN ORGANIZED HEALTH CARE EDUCATION/TRAINING PROGRAM

## 2025-06-23 PROCEDURE — 99213 OFFICE O/P EST LOW 20 MIN: CPT | Performed by: STUDENT IN AN ORGANIZED HEALTH CARE EDUCATION/TRAINING PROGRAM

## 2025-06-23 PROCEDURE — 1036F TOBACCO NON-USER: CPT | Performed by: STUDENT IN AN ORGANIZED HEALTH CARE EDUCATION/TRAINING PROGRAM

## 2025-06-23 PROCEDURE — 99215 OFFICE O/P EST HI 40 MIN: CPT | Performed by: STUDENT IN AN ORGANIZED HEALTH CARE EDUCATION/TRAINING PROGRAM

## 2025-06-23 ASSESSMENT — PAIN - FUNCTIONAL ASSESSMENT: PAIN_FUNCTIONAL_ASSESSMENT: 0-10

## 2025-06-23 ASSESSMENT — ENCOUNTER SYMPTOMS
OCCASIONAL FEELINGS OF UNSTEADINESS: 1
DEPRESSION: 0
LOSS OF SENSATION IN FEET: 0

## 2025-06-23 ASSESSMENT — PAIN SCALES - GENERAL: PAINLEVEL_OUTOF10: 3

## 2025-06-23 NOTE — PROGRESS NOTES
"Orthopaedic Spine Surgery Clinic Note    Patient ID: Ana Bolton is a 79 y.o. female.    Chief Complaint  Chief Complaint   Patient presents with    Spine - Follow-up     CONSULT SURGERY SPINE        History of Present Illness  Ms. Bolton returns for follow-up evaluation of chronic low back pain.  Patient states that she continues to experience significant issues standing and walking due to her postural imbalance.  We again discussed that she has exhausted nonsurgical management thus far including several rounds of PT.  She remains under the care of pain management, Dr. Garvey, at [facility].  She underwent recently right-sided L2-3 and L3-4 medial branch blocks.  She does state that this helped with some of her right-sided back/hip pain, although she does continue to experience significant discomfort particular in the left side of her low back.  However, she related to me again that her primary concern was her postural imbalance and the difficulty she has with standing and walking for extended periods of time due to her stature.      1/27/2025:  Ms. Bolton is a pleasant 79-year-old female who presents for initial evaluation of chronic low back pain.  Patient states that her primary concerns are with regards to the appearance of her low back and its \"crookedness\".  She describes a curvature of her back with resultant postural changes and the strain it puts on her back.  She relates activity related low back pain that worsens with prolonged ambulation.  She does obtain rest relief when she sits or lies down.  She currently denies any lower extremity radiculopathy, although she did state that previously she had right lower extremity radiculopathy.  This is now well-controlled on a regimen of pregabalin.  She denies any current numbness or paresthesias to her lower extremities.  Denies new bowel or bladder control issues or saddle anesthesia.    Of note, patient did relate an episode approximately 2 weeks ago where " she pulled something and sustained a sharp pain in the right side of her low back that persisted.  It ultimately did improve over the last couple weeks.    Patient has tried physical therapy with several therapy sessions throughout July and August 2024.  She has more recently been working with Dr. Felicitas Garvey of pain management.  She had a previous L5-S1 ORION performed which provided good relief of her lower extremity radiculopathy.  Patient also currently is dealing with a nondisplaced fracture of her left distal fibula for which she is in a boot.    Patient is a retired RN.     Prior treatments:  Physical therapy, oral medications including pregabalin and NSAIDs  Pain management (Dr. Felicitas Garvey) -L5-S1 ORION in 2020 for right lower extremity radiculopathy, significant improvement      Relevant PMH/PSH for spine  CKD stage III, last Cr 1.45 on 4/25/2024    Past Medical History:   Diagnosis Date    Allergic contact dermatitis due to plants, except food 07/16/2019    Contact dermatitis due to poison ivy    Body mass index (BMI) 26.0-26.9, adult 05/10/2021    Body mass index (BMI) of 26.0 to 26.9 in adult    Breast mass 05/23/2023    Encounter for general adult medical examination without abnormal findings 04/08/2016    Annual physical exam    Encounter for screening, unspecified 12/16/2014    Screening procedure    Essential (primary) hypertension 03/18/2013    Benign essential hypertension    Impacted cerumen, bilateral 12/30/2020    Bilateral impacted cerumen    Low back pain, unspecified 04/14/2020    Acute low back pain    Lumbar radiculopathy 05/23/2023    Osteoarthritis of knee 05/23/2023    Other conditions influencing health status 03/17/2014    Mammogram    Other dental procedure status     Other dental procedure status    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    Post herpetic neuralgia    Personal history of other benign neoplasm 04/03/2013    History of other benign neoplasm    Personal  history of other diseases of urinary system 12/16/2014    History of hematuria    Personal history of other endocrine, nutritional and metabolic disease 05/08/2020    History of estrogen deficiency    Personal history of other infectious and parasitic diseases 06/10/2020    History of herpes zoster    Personal history of other specified conditions 03/22/2017    History of motion sickness    Personal history of urinary calculi 03/22/2017    History of renal calculi    Polyosteoarthritis, 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    Pure hypercholesterolemia, unspecified 03/18/2013    Low-density-lipoid-type (LDL) hyperlipoproteinemia    Sacroiliac joint dysfunction of right side 05/23/2023    Sciatica, unspecified side 02/14/2020    Sciatic pain    Unspecified symptoms and signs involving the genitourinary system 12/04/2014    Urinary symptom or sign       Past Surgical History:   Procedure Laterality Date    KNEE SURGERY  03/18/2013    Knee Surgery       Social History     Socioeconomic History    Marital status:    Tobacco Use    Smoking status: Never    Smokeless tobacco: Never   Vaping Use    Vaping status: Never Used   Substance and Sexual Activity    Alcohol use: Not Currently    Drug use: Never     Social Drivers of Health     Financial Resource Strain: Low Risk  (11/21/2024)    Received from [employer]    Overall Financial Resource Strain (CARDIA)     Difficulty of Paying Living Expenses: Not hard at all   Food Insecurity: No Food Insecurity (11/21/2024)    Received from [employer]    Hunger Vital Sign     Worried About Running Out of Food in the Last Year: Never true     Ran Out of Food in the Last Year: Never true   Transportation Needs: No Transportation Needs (11/21/2024)    Received from [employer]    PRAPARE - Transportation     Lack of Transportation (Medical): No     Lack of Transportation (Non-Medical): No   Physical Activity: Insufficiently Active (11/21/2024)    Received from [employer]    Exercise Vital  Sign     Days of Exercise per Week: 3 days     Minutes of Exercise per Session: 30 min   Stress: No Stress Concern Present (11/21/2024)    Received from [employer]    [employer] [place] [employer] - Occupational Stress Questionnaire     Feeling of Stress : Not at all   Social Connections: Socially Integrated (11/21/2024)    Received from [employer]    Social Connection and Isolation Panel [NHANES]     Frequency of Communication with Friends and Family: Three times a week     Frequency of Social Gatherings with Friends and Family: Three times a week     Attends Evangelical Services: More than 4 times per year     Active Member of Clubs or Organizations: Yes     Attends Club or Organization Meetings: 1 to 4 times per year     Marital Status:    Intimate Partner Violence: Not At Risk (11/21/2024)    Received from [employer]    Humiliation, Afraid, Rape, and Kick questionnaire     Fear of Current or Ex-Partner: No     Emotionally Abused: No     Physically Abused: No     Sexually Abused: No   Housing Stability: Low Risk  (11/21/2024)    Received from [employer]    Housing Stability Vital Sign     Unable to Pay for Housing in the Last Year: No     Number of Times Moved in the Last Year: 0     Homeless in the Last Year: No       Family History   Problem Relation Name Age of Onset    Colon cancer Mother      Heart attack Father      Breast cancer Sister      Other (cabg) Brother      Heart disease Brother         Allergies   Allergen Reactions    Sulfa (Sulfonamide Antibiotics) Other       Current Outpatient Medications   Medication Instructions    atenolol (TENORMIN) 100 mg, oral, Daily    betamethasone dipropionate 0.05 % cream Topical, 2 times daily    calcium carbonate 600 mg calcium (1,500 mg) tablet 1 tablet, Daily    cholecalciferol (Vitamin D-3) 25 MCG (1000 UT) capsule Take by mouth.    diclofenac sodium 100 g    hydroCHLOROthiazide (HYDRODIURIL) 25 mg, oral, Daily    lidocaine (Xylocaine) 5 %  ointment apply thin layer to affected area twice a day as needed. single application max 5 g. daily max 20 g    lisinopril 40 mg, oral, Daily    predniSONE (Deltasone) 20 mg tablet Take 3 tablets (60 mg) by mouth once daily for 3 days, THEN 2 tablets (40 mg) once daily for 4 days, THEN 1 tablet (20 mg) once daily for 4 days, THEN 0.5 tablets (10 mg) once daily for 4 days.    pregabalin (Lyrica) 150 mg capsule 1 capsule, Every 12 hours scheduled (0630,1830)    simvastatin (ZOCOR) 40 mg, oral, Daily    sodium,potassium,mag sulfates (Suprep) 17.5-3.13-1.6 gram solution Take one bottle beginning at 6pm night before procedure and then take the other bottle 5 hours before procedure time as directed per instruction sheet         Vitals & Measurements  There were no vitals filed for this visit.     Ortho Exam  General: AO x 3, NAD  Cardio: examined extremities perfused by peripheral palpation  Resp: breathing unlabored  Gait: within normal limits, non-antalgic  On upright coronal view, patient with trunk shift to the left with left-sided coronal imbalance of approximately 4 cm  On sagittal view, patient overall appears balanced with head centered over the pelvis.  Slight over exaggeration of thoracic kyphosis    Lower Extremity  Right  Left  IP   5/5  5/5  Quadriceps  5/5  5/5  Tibialis anterior  5/5  5/5  EHL   5/5  5/5  Gastrocnemius  5/5  5/5    Sensation: Normal to light touch throughout lower extremities bilaterally.            Diagnostic Results - Imaging    XR EOS full spine, 6/10/2025  FINDINGS:  Two views of the whole spine.      Upper lumbar/thoracolumbar dextrocurvature. Exaggerated thoracic  kyphosis. Advanced multilevel lumbar discogenic and facet  degenerative change. Suspect moderate thoracic and mild-to-moderate  cervical degenerative changes. The soft tissues are unremarkable.      IMPRESSION:  Thoracolumbar dextrocurvature. Exaggerated thoracic kyphosis.      Multilevel degenerative changes, most  pronounced involving the lumbar  spine.        MRI lumbar spine, 1/20/25  FINDINGS:  For counting purposes the last lumbarized vertebral body is labeled  L5.      There is dextroconvexity of the lumbar spine centered at L1-L2.      Alignment and vertebral body heights are maintained. There is  increased STIR signal within the L2-L3 endplates on the right which  likely represent Modic type 1 degenerative endplate changes. Bone  marrow signal pattern is otherwise within normal limits.      There is desiccated disc signal throughout the lower thoracic and  lumbar spine with loss of disc height along the inner convexity.      The conus terminates at L1 and is unremarkable.      Prevertebral soft tissues are not thickened. Incompletely evaluated  exophytic T2 hyperintense lesion of the left kidney likely represents  a cyst.      Evaluation by level:      T11-T12: No spinal canal or neural foraminal stenosis      T12-L1: Mild left eccentric disc bulge and facet arthrosis. No spinal  canal stenosis. No right and mild left neural foraminal stenosis.      L1-L2: Disc bulge and facet arthrosis. No spinal canal stenosis. No  right and mild left neural foraminal stenosis.      L2-L3: Disc bulge, facet arthrosis and ligamentum flavum thickening.  Mild spinal canal stenosis, narrowing of the subarticular recess,  moderate left and no right neural foraminal stenosis.      L3-L4: Disc bulge, facet arthrosis and ligamentum flavum thickening.  Mild spinal canal stenosis, mild narrowing of the bilateral  subarticular recess, mild left and no right neural foraminal stenosis.      L4-L5: Disc bulge, facet arthrosis and ligamentum flavum thickening.  No spinal canal stenosis, narrowing of the right subarticular recess,  moderate right and no left neural foraminal stenosis.      L5-S1: Disc bulge with a small central disc protrusion and bilateral  facet arthrosis. No spinal canal stenosis. Moderate to severe right  and mild left neural  foraminal stenosis.      IMPRESSION:  Dextroconvexity of the lumbar spine with multilevel degenerative disc  disease and facet arthrosis without high-grade spinal canal stenosis.  Neural foraminal narrowing most pronounced at L5-S1 with moderate to  severe right and mild left neural foraminal stenosis. No significant  interval change compared to the prior exam 06/09/2020.        XR lumbar spine, 1/27/25  FINDINGS:  Advanced lumbar degenerative changes at all levels with a moderate  scoliosis.      No evidence of fracture or lesion. No pathologic motion.      IMPRESSION:      Advanced lumbar degenerative changes at all levels with a moderate  scoliosis.      XR DEXA scan, 2/4/2025  FINDINGS:  LEFT FEMUR -TOTAL  Bone Mineral Density:1.123 g/cm2.  T-Score 0.9  Z-Score 2.7  % change vs Previous : -1.8. % change vs Baseline -4.3.      LEFT FEMUR -NECK  Bone Mineral Density:  0.992 g/cm2.  T-Score -0.3   Z-Score1.6  % change vs Previous : -1.8. % change vs Baseline -8.0.      SPINE L1-L4  Bone Mineral Density:1.822 g/cm2.  T-Score 5.2  Z-Score 6.8  % change vs Previous : 3.6. % change vs Baseline 38.9.      World Health Organization (WHO) criteria for post-menopausal,   Women:  Normal:         T-score at or above -1 SD  Osteopenia:   T-score between -1 and -2.5 SD  Osteoporosis: T-score at or below -2.5 SD              10-year Fracture Risk:  Major Osteoporotic Fracture  Not reported %  Hip Fracture                        Not reported %          IMPRESSION:  According to World Health Organization criteria, classification is  normal.      Follow-up exam is recommended in two years or sooner as clinically  warranted.      All images and detailed analysis are available on the  Radiology  PACS.          Diagnosis  Encounter Diagnoses   Name Primary?    Degenerative scoliosis in adult patient Yes    Chronic bilateral low back pain without sciatica             Assessment/Plan  Ms. Bolton is a pleasant 79-year-old  female presents for follow-up evaluation of chronic low back pain.  Patient reports activity related low back pain that worsens with prolonged ambulation.  She does obtain reliable rest relief.  She denies any lower extremity sciatica, although this has been an issue for her in the past.  She has previously worked with physical therapy as well as pain management (Dr. Felicitas Garvey).  She did have an L5-S1 ORION performed for sciatica in the past which seems to have provided significant relief.  Her current symptoms are now her activity related low back pain as well as the postural imbalance of her degenerative scoliosis.  She is also bothered by the cosmetic appearance of this.  She denies any lower extremity radiculopathy currently.  We did send her for an EOS full-length XR.  She returns for follow-up and discussion.    We did have an extensive discussion in the office today with regards to her symptoms, her imaging findings, and possible management options.  We discussed the nature of her degenerative scoliosis.  On examination, patient does exhibit a trunk shift to the left with coronal imbalance, on review of her full-length XRs, she does have considerable coronal and sagittal imbalance that measures approximately 10 to 11 cm in relation to her C2 plumbline.  Patient MRI does demonstrate foraminal stenosis primarily on the right at L5-S1, which was likely her prior focus of sciatica.  This is currently not bothersome for her.    We discussed the nature of degenerative scoliosis and how this is likely secondary to age-related degeneration in addition to bone density.  Patient did have a bone density scan performed recently which was normal.  She does have a history of CKD with her most recent creatinine 1.45.    We discussed the nature of corrective surgery for her degenerative scoliosis which would likely entail a long segment thoracolumbar instrumented fusion with multilevel osteotomy and deformity correction.  We  discussed the risks, benefits, expected outcomes, and personnel involved with such an undertaking.  We discussed how the goals of the surgery would be to restore her coronal balance and sagittal balance.  We also discussed that some element of low back pain may certainly remain after a long segment fusion.  We discussed how the the surgery would require significant perioperative medical optimization.  She does have elevated creatinine levels which indicates some level of chronic kidney disease.  Patient has not had formal nephrology evaluation, so she would need to undergo this.  We also discussed the gravity of such a surgical undertaking.  This would require full exhaustion of all nonsurgical options before proceeding with something like this.  I did have the patient bring her family,  and daughter, into the office today to have a discussion about the surgery.    I did provide the patient a couple names of surgeons for high-volume spinal deformity surgeons within the  system.  If at any point she has further questions about the process for the surgery, I did provide my office contact information.  After our discussion, the patient articulated understanding of the plan and felt that all questions had been answered satisfactorily. The patient was pleased with the visit and very appreciative for the care rendered.    **Please excuse any errors in grammar or translation related to this dictation. Voice recognition software was utilized to prepare this document. **        No orders of the defined types were placed in this encounter.      --    Vadim Matta MD  Orthopaedic Spine Surgery  , Department of Orthopaedic Surgery  [facility]

## 2025-07-08 ENCOUNTER — APPOINTMENT (OUTPATIENT)
Dept: PRIMARY CARE | Facility: CLINIC | Age: 80
End: 2025-07-08
Payer: MEDICARE

## 2025-07-08 VITALS
HEART RATE: 74 BPM | DIASTOLIC BLOOD PRESSURE: 63 MMHG | SYSTOLIC BLOOD PRESSURE: 98 MMHG | WEIGHT: 154 LBS | HEIGHT: 66 IN | BODY MASS INDEX: 24.75 KG/M2 | OXYGEN SATURATION: 94 %

## 2025-07-08 DIAGNOSIS — H61.23 BILATERAL IMPACTED CERUMEN: ICD-10-CM

## 2025-07-08 DIAGNOSIS — I10 PRIMARY HYPERTENSION: Primary | ICD-10-CM

## 2025-07-08 PROCEDURE — 99213 OFFICE O/P EST LOW 20 MIN: CPT

## 2025-07-08 PROCEDURE — 1036F TOBACCO NON-USER: CPT

## 2025-07-08 PROCEDURE — 3074F SYST BP LT 130 MM HG: CPT

## 2025-07-08 PROCEDURE — 1159F MED LIST DOCD IN RCRD: CPT

## 2025-07-08 PROCEDURE — 3078F DIAST BP <80 MM HG: CPT

## 2025-07-08 PROCEDURE — 1124F ACP DISCUSS-NO DSCNMKR DOCD: CPT

## 2025-07-08 PROCEDURE — G2211 COMPLEX E/M VISIT ADD ON: HCPCS

## 2025-07-08 PROCEDURE — 1123F ACP DISCUSS/DSCN MKR DOCD: CPT

## 2025-07-08 RX ORDER — LISINOPRIL 20 MG/1
20 TABLET ORAL DAILY
Qty: 90 TABLET | Refills: 3 | Status: SHIPPED | OUTPATIENT
Start: 2025-07-08 | End: 2026-07-08

## 2025-07-08 ASSESSMENT — ENCOUNTER SYMPTOMS
SHORTNESS OF BREATH: 0
HYPERTENSION: 1
PALPITATIONS: 0
ORTHOPNEA: 1
SWEATS: 0

## 2025-07-08 NOTE — PROGRESS NOTES
"Subjective   Patient ID: Ana Bolton is a 79 y.o. female who presents for Follow-up (Patient is here today for a 6 month follow up. Patient wanted to mention that she feels she has Hay fever. ).    Hypertension  This is a chronic problem. The current episode started more than 1 year ago. The problem is unchanged. The problem is controlled. Associated symptoms include malaise/fatigue, orthopnea and peripheral edema. Pertinent negatives include no anxiety, chest pain, palpitations, shortness of breath or sweats. There are no associated agents to hypertension. There are no known risk factors for coronary artery disease. Past treatments include beta blockers and ACE inhibitors. The current treatment provides significant improvement. There are no compliance problems.  There is no history of angina. There is no history of chronic renal disease or a hypertension causing med.        Review of Systems   Constitutional:  Positive for malaise/fatigue.   Respiratory:  Negative for shortness of breath.    Cardiovascular:  Positive for orthopnea. Negative for chest pain and palpitations.       Objective   BP 98/63   Pulse 74   Ht 1.676 m (5' 6\")   Wt 69.9 kg (154 lb)   SpO2 94%   BMI 24.86 kg/m²     Physical Exam  Constitutional:       General: She is not in acute distress.     Appearance: Normal appearance. She is not ill-appearing.   HENT:      Right Ear: There is impacted cerumen.      Left Ear: There is impacted cerumen.   Cardiovascular:      Heart sounds: Normal heart sounds.   Pulmonary:      Breath sounds: Normal breath sounds.   Neurological:      Mental Status: She is alert and oriented to person, place, and time.   Psychiatric:         Mood and Affect: Mood normal.         Behavior: Behavior normal.         Thought Content: Thought content normal.         Judgment: Judgment normal.         Assessment/Plan   Ana was seen today for follow-up.  Diagnoses and all orders for this visit:  Primary hypertension  -     " lisinopril 20 mg tablet; Take 1 tablet (20 mg) by mouth once daily.  Bilateral impacted cerumen  -     carbamide peroxide (Debrox) 6.5 % otic solution; Administer 10 drops into each ear 2 times a day for 4 days.

## 2025-07-15 ENCOUNTER — APPOINTMENT (OUTPATIENT)
Dept: PRIMARY CARE | Facility: CLINIC | Age: 80
End: 2025-07-15
Payer: MEDICARE

## 2025-07-15 ENCOUNTER — APPOINTMENT (OUTPATIENT)
Dept: NEUROSURGERY | Facility: CLINIC | Age: 80
End: 2025-07-15
Payer: MEDICARE

## 2026-01-15 ENCOUNTER — APPOINTMENT (OUTPATIENT)
Dept: PRIMARY CARE | Facility: CLINIC | Age: 81
End: 2026-01-15
Payer: MEDICARE